# Patient Record
Sex: FEMALE | Race: WHITE | Employment: UNEMPLOYED | URBAN - METROPOLITAN AREA
[De-identification: names, ages, dates, MRNs, and addresses within clinical notes are randomized per-mention and may not be internally consistent; named-entity substitution may affect disease eponyms.]

---

## 2017-03-14 ENCOUNTER — HOSPITAL ENCOUNTER (EMERGENCY)
Age: 37
Discharge: HOME | End: 2017-03-14
Attending: FAMILY MEDICINE

## 2017-03-14 VITALS
BODY MASS INDEX: 21.53 KG/M2 | RESPIRATION RATE: 16 BRPM | HEIGHT: 66 IN | HEART RATE: 113 BPM | OXYGEN SATURATION: 100 % | TEMPERATURE: 98.7 F | WEIGHT: 134 LBS | DIASTOLIC BLOOD PRESSURE: 78 MMHG | SYSTOLIC BLOOD PRESSURE: 155 MMHG

## 2017-03-14 DIAGNOSIS — J06.9 ACUTE UPPER RESPIRATORY INFECTION: Primary | ICD-10-CM

## 2017-03-15 NOTE — UC PROVIDER NOTE
Patient is a 39 y.o. female presenting with nasal congestion. The history is provided by the patient. Nasal Congestion   This is a new problem. The current episode started more than 2 days ago. The problem occurs constantly. The problem has been gradually improving. Pertinent negatives include no chest pain and no headaches. Nothing aggravates the symptoms. Nothing relieves the symptoms. She has tried nothing for the symptoms. Past Medical History:   Diagnosis Date    Essential hypertension      1 st pregnancy few high blood pressure, 24 hour urine normal - few days before delivery    H/O maternal fourth degree perineal laceration, currently pregnant 10/7/2012    History of PCOS     Infertility, female     clomid pregnancy    Polycystic disease, ovaries     previously on metformin    Postpartum depression     saw a counselor        Past Surgical History:   Procedure Laterality Date    HX GYN      fourth degree laceration         Family History   Problem Relation Age of Onset    Hypertension Father     Heart Disease Father     Diabetes Father      Insluin dependendent (early onset)    Colon Cancer Maternal Grandmother [de-identified]    Cancer Maternal Grandmother      colon    Cancer Maternal Grandfather 58     Melanoma        Social History     Social History    Marital status:      Spouse name: Steven Zeng    Number of children: 0    Years of education: N/A     Occupational History     Farooq Rubio     Social History Main Topics    Smoking status: Never Smoker    Smokeless tobacco: Never Used    Alcohol use No    Drug use: No    Sexual activity: Yes     Partners: Male     Other Topics Concern    Not on file     Social History Narrative                ALLERGIES: Review of patient's allergies indicates no known allergies. Review of Systems   Constitutional: Negative for activity change and chills. HENT: Positive for congestion and sore throat. Respiratory: Negative for cough. Cardiovascular: Negative for chest pain. Musculoskeletal: Negative for arthralgias. Neurological: Negative for headaches. Vitals:    03/14/17 1930   BP: 155/78   Pulse: (!) 113   Resp: 16   Temp: 98.7 °F (37.1 °C)   SpO2: 100%   Weight: 60.8 kg (134 lb)   Height: 5' 6\" (1.676 m)       Physical Exam   Constitutional: She is oriented to person, place, and time. She appears well-developed and well-nourished. HENT:   Right Ear: External ear normal.   Left Ear: External ear normal.   Mouth/Throat: Oropharynx is clear and moist.   Eyes: EOM are normal.   Neck: Normal range of motion. Neck supple. No JVD present. No tracheal deviation present. No thyromegaly present. Cardiovascular: Normal rate, regular rhythm and normal heart sounds. Pulmonary/Chest: Effort normal and breath sounds normal.   Lymphadenopathy:     She has no cervical adenopathy. Neurological: She is alert and oriented to person, place, and time. Skin: Skin is warm and dry. Psychiatric: She has a normal mood and affect. Her behavior is normal. Judgment and thought content normal.   Nursing note and vitals reviewed. MDM     Differential Diagnosis; Clinical Impression; Plan:     CLINICAL IMPRESSION:  Acute upper respiratory infection  (primary encounter diagnosis)    Plan:  1. Rest, push fluids  2.   3.   Risk of Significant Complications, Morbidity, and/or Mortality:   Presenting problems: Moderate  Diagnostic procedures: Moderate  Management options:   Moderate  Progress:   Patient progress:  Stable      Procedures

## 2017-04-05 ENCOUNTER — OFFICE VISIT (OUTPATIENT)
Dept: FAMILY MEDICINE CLINIC | Age: 37
End: 2017-04-05

## 2017-04-05 VITALS
HEART RATE: 90 BPM | WEIGHT: 135.8 LBS | DIASTOLIC BLOOD PRESSURE: 81 MMHG | HEIGHT: 66 IN | SYSTOLIC BLOOD PRESSURE: 118 MMHG | OXYGEN SATURATION: 98 % | BODY MASS INDEX: 21.83 KG/M2 | RESPIRATION RATE: 18 BRPM | TEMPERATURE: 97.5 F

## 2017-04-05 DIAGNOSIS — G47.00 INSOMNIA, UNSPECIFIED TYPE: ICD-10-CM

## 2017-04-05 DIAGNOSIS — F41.9 ANXIETY: Primary | ICD-10-CM

## 2017-04-05 RX ORDER — TRAZODONE HYDROCHLORIDE 50 MG/1
50 TABLET ORAL
Qty: 30 TAB | Refills: 1 | Status: SHIPPED | OUTPATIENT
Start: 2017-04-05 | End: 2017-05-03

## 2017-04-05 RX ORDER — BUSPIRONE HYDROCHLORIDE 5 MG/1
5 TABLET ORAL 2 TIMES DAILY
Qty: 60 TAB | Refills: 1 | Status: SHIPPED | OUTPATIENT
Start: 2017-04-05 | End: 2017-07-27

## 2017-04-05 NOTE — PROGRESS NOTES
\"Reviewed record in preparation for visit and have obtained the necessary documentation\"  Chief Complaint   Patient presents with    New Patient    Anxiety    Insomnia       Patient presents in the office today as a new patient establishing care with a new PCP     Patient has past hx of PCOS     Patient would also like to discuss anxiety and insomnia related to a recent miscarriage in November         PHQ 2 / 9, over the last two weeks 4/5/2017   Little interest or pleasure in doing things Not at all   Feeling down, depressed or hopeless Not at all   Total Score PHQ 2 0

## 2017-04-05 NOTE — PROGRESS NOTES
HISTORY OF PRESENT ILLNESS  Clemencia Saxena is a 39 y.o. female. HPI: New patient comes in to get established and reports having anxiety/panic attacks with insomnia. Her symptoms started after she had miscarriage in Feb.2017. She had partial mole pregnancy and has to see her OB for monthly blood work. At times she has not been able to sleep all night due to anxiety, especially when her  is in business trip. She has two children 11and 3years old boys. She has tried tylenol PM but it didn't work. She is currently seeing a counselor and it is helping with anxiety. Past Medical History:   Diagnosis Date    Essential hypertension      1 st pregnancy few high blood pressure, 24 hour urine normal - few days before delivery    H/O maternal fourth degree perineal laceration, currently pregnant 10/7/2012    History of PCOS     Infertility, female     clomid pregnancy    Polycystic disease, ovaries     previously on metformin    Postpartum depression     saw a counselor     Past Surgical History:   Procedure Laterality Date    HX GYN      fourth degree laceration   No Known Allergies    Current Outpatient Prescriptions:     traZODone (DESYREL) 50 mg tablet, Take 1 Tab by mouth nightly., Disp: 30 Tab, Rfl: 1    busPIRone (BUSPAR) 5 mg tablet, Take 1 Tab by mouth two (2) times a day., Disp: 60 Tab, Rfl: 1  Review of Systems   Constitutional: Negative. Respiratory: Negative. Cardiovascular: Negative. Gastrointestinal: Negative. Psychiatric/Behavioral: Negative for depression, substance abuse and suicidal ideas. The patient is nervous/anxious and has insomnia. Blood pressure 118/81, pulse 90, temperature 97.5 °F (36.4 °C), temperature source Oral, resp. rate 18, height 5' 6\" (1.676 m), weight 135 lb 12.8 oz (61.6 kg), last menstrual period 03/31/2017, SpO2 98 %, not currently breastfeeding. Physical Exam   Constitutional: No distress.    HENT:   Mouth/Throat: Oropharynx is clear and moist.   Neck: Neck supple. Cardiovascular: Normal rate and regular rhythm. No murmur heard. Pulmonary/Chest: Effort normal and breath sounds normal.   Abdominal: Soft. Bowel sounds are normal.   Psychiatric: She has a normal mood and affect. Her behavior is normal.   Nursing note and vitals reviewed. ASSESSMENT and PLAN    ICD-10-CM ICD-9-CM    1. Anxiety F41.9 300.00 busPIRone (BUSPAR) 5 mg tablet   2.  Insomnia, unspecified type G47.00 780.52 traZODone (DESYREL) 50 mg tablet   Advised to follow up in one months  Pt was given an after visit summary which includes diagnosis, current medicines and vital and voiced understanding of treatment plan

## 2017-04-05 NOTE — MR AVS SNAPSHOT
Visit Information Date & Time Provider Department Dept. Phone Encounter #  
 4/5/2017 10:45 AM Martha Desai  [place] [phone] [id number] Upcoming Health Maintenance Date Due DTaP/Tdap/Td series (1 - Tdap) 10/12/2000 INFLUENZA AGE 9 TO ADULT 8/1/2016 PAP AKA CERVICAL CYTOLOGY 2/3/2017 Allergies as of 4/5/2017  Review Complete On: 4/5/2017 By: Martha Desai NP No Known Allergies Current Immunizations  Reviewed on 10/23/2015 Name Date Influenza Vaccine (Quad) PF 10/23/2015 Influenza Vaccine PF 9/19/2014, 9/24/2013 Influenza Vaccine Split 10/9/2012 12:18 PM  
  
 Not reviewed this visit You Were Diagnosed With   
  
 Codes Comments Insomnia, unspecified type    -  Primary ICD-10-CM: G47.00 ICD-9-CM: 780.52 Anxiety     ICD-10-CM: F41.9 ICD-9-CM: 300.00 Vitals BP Pulse Temp Resp Height(growth percentile) Weight(growth percentile) 118/81 (BP 1 Location: Left arm, BP Patient Position: Sitting) 90 97.5 °F (36.4 °C) (Oral) 18 5' 6\" (1.676 m) 135 lb 12.8 oz (61.6 kg) LMP SpO2 BMI OB Status Smoking Status 03/31/2017 98% 21.92 kg/m2 Having regular periods Never Smoker Vitals History BMI and BSA Data Body Mass Index Body Surface Area  
 21.92 kg/m 2 1.69 m 2 Preferred Pharmacy Pharmacy Name Phone [facility] [address] [place] [address] [phone] Your Updated Medication List  
  
   
This list is accurate as of: 4/5/17 11:07 AM.  Always use your most recent med list.  
  
  
  
  
 busPIRone 5 mg tablet Commonly known as:  BUSPAR Take 1 Tab by mouth two (2) times a day. traZODone 50 mg tablet Commonly known as:  Natalio Gold Take 1 Tab by mouth nightly. Prescriptions Sent to Pharmacy Refills  
 traZODone (DESYREL) 50 mg tablet 1 Sig: Take 1 Tab by mouth nightly. Class: Normal  
 Pharmacy: [facility] [address] [place] [address], [address] Ph #: [phone] Route: Oral  
 busPIRone (BUSPAR) 5 mg tablet 1 Sig: Take 1 Tab by mouth two (2) times a day. Class: Normal  
 Pharmacy: [facility] [address] [place] [address], 2000 [facility] [address] Ph #: [phone] Route: Oral  
  
Patient Instructions Insomnia: Care Instructions Your Care Instructions Insomnia is the inability to sleep well. It is a common problem for most people at some time. Insomnia may make it hard for you to get to sleep, stay asleep, or sleep as long as you need to. This can make you tired and grouchy during the day. It can also make you forgetful, less effective at work, and unhappy. Insomnia can be caused by conditions such as depression or anxiety. Pain can also affect your ability to sleep. When these problems are solved, the insomnia usually clears up. But sometimes bad sleep habits can cause insomnia. If insomnia is affecting your work or your enjoyment of life, you can take steps to improve your sleep. Follow-up care is a key part of your treatment and safety. Be sure to make and go to all appointments, and call your doctor if you are having problems. It's also a good idea to know your test results and keep a list of the medicines you take. How can you care for yourself at home? What to avoid · Do not have drinks with caffeine, such as coffee or black tea, for 8 hours before bed. · Do not smoke or use other types of tobacco near bedtime. Nicotine is a stimulant and can keep you awake. · Avoid drinking alcohol late in the evening, because it can cause you to wake in the middle of the night. · Do not eat a big meal close to bedtime. If you are hungry, eat a light snack.  
· Do not drink a lot of water close to bedtime, because the need to urinate may wake you up during the night. · Do not read or watch TV in bed. Use the bed only for sleeping and sexual activity. What to try · Go to bed at the same time every night, and wake up at the same time every morning. Do not take naps during the day. · Keep your bedroom quiet, dark, and cool. · Sleep on a comfortable pillow and mattress. · If watching the clock makes you anxious, turn it facing away from you so you cannot see the time. · If you worry when you lie down, start a worry book. Well before bedtime, write down your worries, and then set the book and your concerns aside. · Try meditation or other relaxation techniques before you go to bed. · If you cannot fall asleep, get up and go to another room until you feel sleepy. Do something relaxing. Repeat your bedtime routine before you go to bed again. · Make your house quiet and calm about an hour before bedtime. Turn down the lights, turn off the TV, log off the computer, and turn down the volume on music. This can help you relax after a busy day. When should you call for help? Watch closely for changes in your health, and be sure to contact your doctor if: 
· Your efforts to improve your sleep do not work. · Your insomnia gets worse. · You have been feeling down, depressed, or hopeless or have lost interest in things that you usually enjoy. Where can you learn more? Go to http://elsy-pacheco.info/. Enter P513 in the search box to learn more about \"Insomnia: Care Instructions. \" Current as of: July 26, 2016 Content Version: 11.2 © 5465-9269 [employer]. Care instructions adapted under license by [employer] (which disclaims liability or warranty for this information). If you have questions about a medical condition or this instruction, always ask your healthcare professional. [address] any warranty or liability for your use of this information. Introducing [facility]! Dear Sarah Trinidad: Thank you for requesting a [employer] account. Our records indicate that you have previously registered for a [employer] account but its currently inactive. Please call our [employer] support line at [phone]. Additional Information If you have questions, please visit the Frequently Asked Questions section of the [employer] website at [email or web address] Remember, [employer] is NOT to be used for urgent needs. For medical emergencies, dial 911. Now available from your iPhone and Android! Please provide this summary of care documentation to your next provider. Your primary care clinician is listed as Ellie León. If you have any questions after today's visit, please call [phone].

## 2017-05-03 ENCOUNTER — OFFICE VISIT (OUTPATIENT)
Dept: FAMILY MEDICINE CLINIC | Age: 37
End: 2017-05-03

## 2017-05-03 VITALS
BODY MASS INDEX: 22.02 KG/M2 | SYSTOLIC BLOOD PRESSURE: 108 MMHG | RESPIRATION RATE: 18 BRPM | OXYGEN SATURATION: 98 % | HEIGHT: 66 IN | DIASTOLIC BLOOD PRESSURE: 68 MMHG | TEMPERATURE: 98.5 F | HEART RATE: 100 BPM | WEIGHT: 137 LBS

## 2017-05-03 DIAGNOSIS — N91.2 AMENORRHEA: Primary | ICD-10-CM

## 2017-05-03 DIAGNOSIS — F41.9 ANXIETY: ICD-10-CM

## 2017-05-03 NOTE — PROGRESS NOTES
1. Have you been to the ER, urgent care clinic since your last visit? Hospitalized since your last visit? No    2. Have you seen or consulted any other health care providers outside of the [address] [place] [address] since your last visit? Include any pap smears or colon screening.  No\    Chief Complaint   Patient presents with    Follow-up     patient is here for a 1month follow up (medication)     Learning Assessment 9/25/2013   PRIMARY LEARNER Patient   PRIMARY LANGUAGE ENGLISH   LEARNER PREFERENCE PRIMARY LISTENING   ANSWERED BY Patient   RELATIONSHIP SELF

## 2017-05-03 NOTE — PROGRESS NOTES
HISTORY OF PRESENT ILLNESS  Clemencia Russo is a 39 y.o. female. HPI: Patient is following up on anxiety/insomnia. Reports stopped taking Trazodone after a week, but was taking Buspar until four days ago, it helped her with anxiety. She had unprotected sex over the Week End and believes she is pregnant and thus she stopped taking Buspar. She is late 4-5 days for her monthly menstrual cycle. She is worried that she might be pregnant, was advised by her OB not to get pregnant for six months after her miscarrige on mole pregnancy and finishing monthly blood wok for six months. Her home pregnancy test was negative, requesting serum HCG. Past Medical History:   Diagnosis Date    Essential hypertension      1 st pregnancy few high blood pressure, 24 hour urine normal - few days before delivery    H/O maternal fourth degree perineal laceration, currently pregnant 10/7/2012    History of PCOS     Infertility, female     clomid pregnancy    Polycystic disease, ovaries     previously on metformin    Postpartum depression     saw a counselor     Past Surgical History:   Procedure Laterality Date    HX GYN      fourth degree laceration   No Known Allergies    Current Outpatient Prescriptions:     busPIRone (BUSPAR) 5 mg tablet, Take 1 Tab by mouth two (2) times a day., Disp: 60 Tab, Rfl: 1  Review of Systems   Constitutional: Negative. Respiratory: Negative. Cardiovascular: Negative. Gastrointestinal: Negative. Genitourinary: Negative. Psychiatric/Behavioral: Negative for depression, hallucinations, memory loss, substance abuse and suicidal ideas. The patient is nervous/anxious. The patient does not have insomnia. Blood pressure 108/68, pulse 100, temperature 98.5 °F (36.9 °C), temperature source Oral, resp. rate 18, height 5' 6\" (1.676 m), weight 137 lb (62.1 kg), last menstrual period 03/31/2017, SpO2 98 %, not currently breastfeeding. Physical Exam   Constitutional: No distress. HENT:   Mouth/Throat: Oropharynx is clear and moist.   Neck: Neck supple. Cardiovascular: Normal rate and regular rhythm. No murmur heard. Pulmonary/Chest: Effort normal and breath sounds normal.   Abdominal: Soft. Bowel sounds are normal.   Psychiatric: She has a normal mood and affect. Her behavior is normal.   Nursing note and vitals reviewed. ASSESSMENT and PLAN    ICD-10-CM ICD-9-CM    1. Amenorrhea N91.2 626.0 HCG QL SERUM   2.  Anxiety F41.9 300.00    await labs  Hold Buspar, if pregnant, will change it to Zoloft  Pt was given an after visit summary which includes diagnosis, current medicines and vital and voiced understanding of treatment plan

## 2017-05-04 LAB — B-HCG SERPL QL: NEGATIVE MIU/ML

## 2017-05-05 ENCOUNTER — TELEPHONE (OUTPATIENT)
Dept: FAMILY MEDICINE CLINIC | Age: 37
End: 2017-05-05

## 2017-07-26 ENCOUNTER — OFFICE VISIT (OUTPATIENT)
Dept: OBGYN CLINIC | Age: 37
End: 2017-07-26

## 2017-07-26 VITALS — BODY MASS INDEX: 23.24 KG/M2 | WEIGHT: 144 LBS | DIASTOLIC BLOOD PRESSURE: 66 MMHG | SYSTOLIC BLOOD PRESSURE: 100 MMHG

## 2017-07-26 DIAGNOSIS — Z34.81 PRENATAL CARE, SUBSEQUENT PREGNANCY, FIRST TRIMESTER: Primary | ICD-10-CM

## 2017-07-26 DIAGNOSIS — Z34.81 PRENATAL CARE, SUBSEQUENT PREGNANCY IN FIRST TRIMESTER: ICD-10-CM

## 2017-07-26 NOTE — PROGRESS NOTES
Current pregnancy history:    Meena Sweeney is a ,  39 y.o. female [facility] Patient's last menstrual period was 2017. .  She presents for the evaluation of amenorrhea and a positive pregnancy test.    LMP history:  The date of her LMP is very certain. Her last menstrual period was normal and lasted for 4 to 5 days. Based on her LMP, her EDC is 3/14 and her EGA is 7 weeks,0 days. Ultrasound data:  A SINGLE VIABLE 6W5D WITH HENNA OF 2018 IUP IS SEEN WITH NORMAL CARDIAC RHYTHM. GESTATIONAL AGE BASED ON ULTRASOUND. A NORMAL YOLK SAC IS SEEN. RIGHT OVARY APPEAR WITHIN NORMAL LIMITS. LEFT OVARY APPEARS WITHING NORMAL LIMITS. NO FREE FLUID IS SEEN IN THE CDS. Pregnancy symptoms:    Since her LMP she has experienced  urinary frequency, breast tenderness, and nausea. She has been vomiting over the last few weeks. Associated signs and symptoms which she denies: dysuria, discharge, vaginal bleeding. She states she has gained weight:  Approximately 5 pounds over the last few weeks. Relevant past pregnancy history:   She has the following pregnancy history: Her last pregnancy was uncomplicated. She has no history of  delivery. Relevant past medical history:(relevant to this pregnancy): noncontributory. 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 Substance history: negative for alcohol, tobacco and street drugs. Positive for nothing. Exposure history: There is/are no indoor cat/s in the home. The patient was instructed to not change the cat litter. She admits close contact with children on a regular basis. She has had chicken pox or the vaccine in the past.   Patient denies issues with domestic violence. Genetic Screening/Teratology Counseling: (Includes patient, baby's father, or anyone in either family with:)  3.  Patient's age >/= 28 at [facility]?-- YES  2.   Thalassemia ([facility], [facility], [address], or  background): MCV<80?--no.     3.  Neural tube defect (meningomyelocele, spina bifida, anencephaly)?--no.   4.  Congenital heart defect?--no.  5.  Down syndrome?--no.   6.  Robb-Sachs ([place], [place])?--no.   7.  Canavan's Disease?--no.   8.  Familial Dysautonomia?--no.   9.  Sickle cell disease or trait ()? --no   The patient has not been tested for sickle trait  10. Hemophilia or other blood disorders?--no. 11.  Muscular dystrophy?--no. 12.  Cystic fibrosis?--no. 13.  [place]'s Chorea?--no. 14.  Mental retardation/autism (if yes was person tested for Fragile X)?--no. 15.  Other inherited genetic or chromosomal disorder?--no. 12.  Maternal metabolic disorder (DM, PKU, etc)?--no. 17.  Patient or FOB with a child with a birth defect not listed above?--no.  17a. Patient or FOB with a birth defect themselves?--no. 18.  Recurrent pregnancy loss, or stillbirth?--no. 19.  Any medications since LMP other than prenatal vitamins (include vitamins, supplements, OTC meds, drugs, alcohol)?--no. 20.  Any other genetic/environmental exposure to discuss?--no. Infection History:  1. Lives with someone with TB or TB exposed?--no.   2.  Patient or partner has history of genital herpes?--no.  3.  Rash or viral illness since LMP?--no.    4.  History of STD (GC, CT, HPV, syphilis, HIV)? --no   5. Other: OTHER?       Past Medical History:   Diagnosis Date    Essential hypertension      1 st pregnancy few high blood pressure, 24 hour urine normal - few days before delivery    H/O maternal fourth degree perineal laceration, currently pregnant 10/7/2012    History of PCOS     Infertility, female     clomid pregnancy    Polycystic disease, ovaries     previously on metformin    Postpartum depression     saw a counselor     Past Surgical History:   Procedure Laterality Date    HX GYN      fourth degree laceration     Social History     Occupational History    [employer]     Social History Main Topics    Smoking status: Never Smoker    Smokeless tobacco: Never Used    Alcohol use 0.0 oz/week      Comment: socially     Drug use: No    Sexual activity: Yes     Partners: Male     Birth control/ protection: None     Family History   Problem Relation Age of Onset    Hypertension Father     Heart Disease Father     Diabetes Father      Insluin dependendent (early onset)    Colon Cancer Maternal Grandmother [de-identified]    Cancer Maternal Grandmother      colon    Cancer Maternal Grandfather 58     Melanoma     OB History    Para Term  AB Living   5 2 2   2   SAB TAB Ectopic Molar Multiple Live Births        2      # Outcome Date GA Lbr Jonah/2nd Weight Sex Delivery Anes PTL Lv   5 Current            4 Term 01/25/15 38w4d 01:44 / :22 8 lb 4.5 oz (3.755 kg) M VAGINAL DELI EPIDURAL AN N PRATIK   3 Term 10/07/12 40w6d  8 lb 1.8 oz (3.68 kg) M VAGINAL DELI Local N PRATIK      Birth Comments: Fourth degree repair   2             1                  No Known Allergies  Prior to Admission medications    Medication Sig Start Date End Date Taking? Authorizing Provider   PNV No12-Iron-FA-DSS-OM-3 29 mg iron-1 mg -50 mg CPKD Take  by mouth. Yes Historical Provider   busPIRone (BUSPAR) 5 mg tablet Take 1 Tab by mouth two (2) times a day.  17   Calvin Fletcher NP        Review of Systems: History obtained from the patient  Constitutional: negative for weight loss, fever, night sweats  HEENT: negative for hearing loss, earache, congestion, snoring, sore throat  CV: negative for chest pain, palpitations, edema  Resp: negative for cough, shortness of breath, wheezing  Breast: negative for breast lumps, nipple discharge, galactorrhea  GI: negative for change in bowel habits, abdominal pain, black or bloody stools  : negative for frequency, dysuria, hematuria, vaginal discharge  MSK: negative for back pain, joint pain, muscle pain  Skin: negative for itching, rash, hives  Neuro: negative for dizziness, headache, confusion, weakness  Psych: negative for anxiety, depression, change in mood  Heme/lymph: negative for bleeding, bruising, pallor    Objective:  Visit Vitals    /66    Wt 144 lb (65.3 kg)    LMP 03/31/2017    BMI 23.24 kg/m2       Physical Exam:     Constitutional  · Appearance: well-nourished, well developed, alert, in no acute distress    HENT  · Head  · Face: appears normal  · Eyes: appear normal  · Ears: normal  · Mouth: normal  · Lips: no lesions    ·      Chest  · Respiratory Effort: breathing unlabored  · Auscultation: normal breath sounds    Cardiovascular  · Heart:  · Auscultation: regular rate and rhythm without murmur    Gastrointestinal  · Abdominal Examination: abdomen non-tender to palpation, normal bowel sounds, no masses present  · Liver and spleen: no hepatomegaly present, spleen not palpable  · Hernias: no hernias identified    Genitourinary  · deferred    Skin  · General Inspection: no rash, no lesions identified    Neurologic/Psychiatric  · Mental Status:  · Orientation: grossly oriented to person, place and time  · Mood and Affect: mood normal, affect appropriate    Assessment:   Intrauterine pregnancy with the following problems identified: AMA- panorama and FS; hx partial molar pregnancy, hx SVDx2 boys; 1st w 4th degree lac; safe 2nd, CF declined    Plan:   Prenatal labs next visit w [facility] samples  Offered CF testing, CVS, Nuchal Translucency, MSAFP, amnio, and discussed NIPT; panorama  Course of pregnancy discussed including visit schedule, routine U/S, glucola testing, etc.  Avoid alcoholic beverages and illicit/recreational drugs use  Take prenatal vitamins or folic acid daily. Hospital and practice style discussed with coverage system.   Discussed nutrition, toxoplasmosis precautions, sexual activity, exercise, need for influenza vaccine, environmental and work hazards, travel advice, screen for domestic violence, need for seat belts. Discussed seafood, unpasteurized dairy products, deli meat, artificial sweeteners, and caffeine. Discussed current prescription drug use. Given medication list.  Discussed the use of over the counter medications and chemicals. Route of delivery discussed, including risks, benefits     Handouts given to pt.

## 2017-07-31 ENCOUNTER — TELEPHONE (OUTPATIENT)
Dept: OBGYN CLINIC | Age: 37
End: 2017-07-31

## 2017-07-31 RX ORDER — DOXYLAMINE SUCCINATE AND PYRIDOXINE HYDROCHLORIDE, DELAYED RELEASE TABLETS 10 MG/10 MG 10; 10 MG/1; MG/1
1 TABLET, DELAYED RELEASE ORAL
Qty: 120 TAB | Refills: 2 | Status: SHIPPED | OUTPATIENT
Start: 2017-07-31 | End: 2017-09-07

## 2017-07-31 RX ORDER — DOXYLAMINE SUCCINATE AND PYRIDOXINE HYDROCHLORIDE, DELAYED RELEASE TABLETS 10 MG/10 MG 10; 10 MG/1; MG/1
1 TABLET, DELAYED RELEASE ORAL
Qty: 120 TAB | Refills: 2 | Status: SHIPPED | OUTPATIENT
Start: 2017-07-31 | End: 2017-08-18

## 2017-07-31 NOTE — TELEPHONE ENCOUNTER
Please delete one prescription so prior can be obtained.  Please call pharmacy to discontinue the deleted prescription

## 2017-07-31 NOTE — TELEPHONE ENCOUNTER
Message left at 11:19am       39year old  7w5d pregnant patient last seen in the office 17. Patient left a message for a refill on her Diclegis for nausea. This nurse called that patient and transferred the patient to Dr. Kristopher Hill nurse. Patient verbalized understanding. Patient called back at 1:!3PM. This nurse transferred the patient a second time and stated I would send a message. Patient verbalized understanding.

## 2017-08-02 NOTE — TELEPHONE ENCOUNTER
Called [employer] for prior auth and given new id # , advised pharmacy of new [employer] and they state they will call insurance company

## 2017-08-17 LAB
HBSAG, EXTERNAL: NEGATIVE
HIV, EXTERNAL: NON REACTIVE
RUBELLA, EXTERNAL: NORMAL
T. PALLIDUM, EXTERNAL: NEGATIVE

## 2017-08-18 ENCOUNTER — ROUTINE PRENATAL (OUTPATIENT)
Dept: OBGYN CLINIC | Age: 37
End: 2017-08-18

## 2017-08-18 VITALS — SYSTOLIC BLOOD PRESSURE: 108 MMHG | WEIGHT: 147 LBS | DIASTOLIC BLOOD PRESSURE: 64 MMHG | BODY MASS INDEX: 23.73 KG/M2

## 2017-08-18 DIAGNOSIS — Z34.81 PRENATAL CARE, SUBSEQUENT PREGNANCY IN FIRST TRIMESTER: ICD-10-CM

## 2017-08-18 DIAGNOSIS — Z34.81 PRENATAL CARE, SUBSEQUENT PREGNANCY, FIRST TRIMESTER: Primary | ICD-10-CM

## 2017-08-18 NOTE — PATIENT INSTRUCTIONS

## 2017-08-18 NOTE — PROGRESS NOTES
TA ULTRASOUND PERFORMED  A SINGLE VIABLE 10W2D WITH IUP IS SEEN WITH NORMAL CARDIAC RHYTHM. GESTATIONAL AGE BASED ON ULTRASOUND. A NORMAL YOLK SAC IS SEEN. RIGHT OVARY APPEAR WITHIN NORMAL LIMITS. LEFT OVARY APPEARS WITHING NORMAL LIMITS.      Happy and relieved; now wants to decline any genetic testing but FS

## 2017-08-22 LAB
ABO GROUP BLD: NORMAL
BLD GP AB SCN SERPL QL: NEGATIVE
ERYTHROCYTE [DISTWIDTH] IN BLOOD BY AUTOMATED COUNT: 17.1 % (ref 12.3–15.4)
HBV SURFACE AG SERPL QL IA: NEGATIVE
HCT VFR BLD AUTO: 39 % (ref 34–46.6)
HGB BLD-MCNC: 12.8 G/DL (ref 11.1–15.9)
HIV 1+2 AB+HIV1 P24 AG SERPL QL IA: NON REACTIVE
MCH RBC QN AUTO: 26.3 PG (ref 26.6–33)
MCHC RBC AUTO-ENTMCNC: 32.8 G/DL (ref 31.5–35.7)
MCV RBC AUTO: 80 FL (ref 79–97)
PLATELET # BLD AUTO: 234 X10E3/UL (ref 150–379)
RBC # BLD AUTO: 4.86 X10E6/UL (ref 3.77–5.28)
RH BLD: POSITIVE
RUBV IGG SERPL IA-ACNC: 2.5 INDEX
T PALLIDUM AB SER QL IA: NEGATIVE
WBC # BLD AUTO: 7.8 X10E3/UL (ref 3.4–10.8)

## 2017-08-23 LAB
C TRACH RRNA CVX QL NAA+PROBE: NEGATIVE
CYTOLOGIST CVX/VAG CYTO: NORMAL
CYTOLOGY CVX/VAG DOC THIN PREP: NORMAL
DX ICD CODE: NORMAL
LABCORP, 190119: NORMAL
Lab: NORMAL
N GONORRHOEA RRNA CVX QL NAA+PROBE: NEGATIVE
OTHER STN SPEC: NORMAL
PATH REPORT.FINAL DX SPEC: NORMAL
STAT OF ADQ CVX/VAG CYTO-IMP: NORMAL
T VAGINALIS RRNA SPEC QL NAA+PROBE: NEGATIVE

## 2017-09-07 ENCOUNTER — ROUTINE PRENATAL (OUTPATIENT)
Dept: OBGYN CLINIC | Age: 37
End: 2017-09-07

## 2017-09-07 VITALS
WEIGHT: 150.25 LBS | DIASTOLIC BLOOD PRESSURE: 76 MMHG | SYSTOLIC BLOOD PRESSURE: 112 MMHG | BODY MASS INDEX: 24.25 KG/M2

## 2017-09-07 DIAGNOSIS — Z34.82 PRENATAL CARE, SUBSEQUENT PREGNANCY, SECOND TRIMESTER: Primary | ICD-10-CM

## 2017-09-07 DIAGNOSIS — Z34.81 PRENATAL CARE, SUBSEQUENT PREGNANCY IN FIRST TRIMESTER: ICD-10-CM

## 2017-09-07 NOTE — PATIENT INSTRUCTIONS

## 2017-09-28 ENCOUNTER — ROUTINE PRENATAL (OUTPATIENT)
Dept: OBGYN CLINIC | Age: 37
End: 2017-09-28

## 2017-09-28 VITALS
BODY MASS INDEX: 24.81 KG/M2 | SYSTOLIC BLOOD PRESSURE: 140 MMHG | HEIGHT: 66 IN | DIASTOLIC BLOOD PRESSURE: 82 MMHG | WEIGHT: 154.4 LBS

## 2017-09-28 DIAGNOSIS — Z34.82 PRENATAL CARE, SUBSEQUENT PREGNANCY, SECOND TRIMESTER: Primary | ICD-10-CM

## 2017-09-28 NOTE — PROGRESS NOTES
Anxiety with this visit today due to history of 16 week loss.  Feels much better after hearing [facility]- ana cristina scan with next visit- feeling hopefully and excited for next visit

## 2017-09-28 NOTE — PATIENT INSTRUCTIONS

## 2017-10-27 ENCOUNTER — ROUTINE PRENATAL (OUTPATIENT)
Dept: OBGYN CLINIC | Age: 37
End: 2017-10-27

## 2017-10-27 ENCOUNTER — HOSPITAL ENCOUNTER (OUTPATIENT)
Dept: PERINATAL CARE | Age: 37
Discharge: HOME | End: 2017-10-27
Attending: OBSTETRICS & GYNECOLOGY
Payer: COMMERCIAL

## 2017-10-27 VITALS — BODY MASS INDEX: 25.87 KG/M2 | WEIGHT: 160.25 LBS

## 2017-10-27 DIAGNOSIS — Z34.82 PRENATAL CARE, SUBSEQUENT PREGNANCY IN SECOND TRIMESTER: Primary | ICD-10-CM

## 2017-10-27 DIAGNOSIS — Z34.81 PRENATAL CARE, SUBSEQUENT PREGNANCY IN FIRST TRIMESTER: ICD-10-CM

## 2017-10-27 PROCEDURE — 76811 OB US DETAILED SNGL FETUS: CPT | Performed by: OBSTETRICS & GYNECOLOGY

## 2017-10-27 NOTE — PATIENT INSTRUCTIONS

## 2017-11-21 ENCOUNTER — ROUTINE PRENATAL (OUTPATIENT)
Dept: OBGYN CLINIC | Age: 37
End: 2017-11-21

## 2017-11-21 VITALS — WEIGHT: 166 LBS | BODY MASS INDEX: 26.79 KG/M2 | DIASTOLIC BLOOD PRESSURE: 76 MMHG | SYSTOLIC BLOOD PRESSURE: 120 MMHG

## 2017-11-21 DIAGNOSIS — Z34.81 PRENATAL CARE, SUBSEQUENT PREGNANCY IN FIRST TRIMESTER: ICD-10-CM

## 2017-11-21 NOTE — PATIENT INSTRUCTIONS
Weeks 22 to 26 of Your Pregnancy: Care Instructions  Your Care Instructions    As you enter your 7th month of pregnancy at week 26, your baby's lungs are growing stronger and getting ready to breathe. You may notice that your baby responds to the sound of your or your partner's voice. You may also notice that your baby does less turning and twisting and more squirming or jerking. Jerking often means that your baby has the hiccups. Hiccups are perfectly normal and are only temporary. You may want to think about attending a childbirth preparation class. This is also a good time to start thinking about whether you want to have pain medicine during labor. Most pregnant women are tested for gestational diabetes between weeks 25 and 28. Gestational diabetes occurs when your blood sugar level gets too high when you're pregnant. The test is important, because you can have gestational diabetes and not know it. But the condition can cause problems for your baby. Follow-up care is a key part of your treatment and safety. Be sure to make and go to all appointments, and call your doctor if you are having problems. It's also a good idea to know your test results and keep a list of the medicines you take. How can you care for yourself at home? Ease discomfort from your baby's kicking  · Change your position. Sometimes this will cause your baby to change position too. · Take a deep breath while you raise your arm over your head. Then breathe out while you drop your arm. Do Kegel exercises to prevent urine from leaking  · You can do Kegel exercises while you stand or sit. ¨ Squeeze the same muscles you would use to stop your urine. Your belly and thighs should not move. ¨ Hold the squeeze for 3 seconds, and then relax for 3 seconds. ¨ Start with 3 seconds. Then add 1 second each week until you are able to squeeze for 10 seconds. ¨ Repeat the exercise 10 to 15 times for each session.  Do three or more sessions each day.  Ease or reduce swelling in your feet, ankles, hands, and fingers  · If your fingers are puffy, take off your rings. · Do not eat high-salt foods, such as potato chips. · Prop up your feet on a stool or couch as much as possible. Sleep with pillows under your feet. · Do not stand for long periods of time or wear tight shoes. · Wear support stockings. Where can you learn more? Go to http://elsy-pacheco.info/. Enter G264 in the search box to learn more about \"Weeks 22 to 26 of Your Pregnancy: Care Instructions. \"  Current as of: March 16, 2017  Content Version: 11.4  © 1355-3822 [employer]. Care instructions adapted under license by [employer] (which disclaims liability or warranty for this information). If you have questions about a medical condition or this instruction, always ask your healthcare professional. [place] [address] any warranty or liability for your use of this information.

## 2017-12-20 ENCOUNTER — ROUTINE PRENATAL (OUTPATIENT)
Dept: OBGYN CLINIC | Age: 37
End: 2017-12-20

## 2017-12-20 VITALS — DIASTOLIC BLOOD PRESSURE: 82 MMHG | WEIGHT: 167 LBS | SYSTOLIC BLOOD PRESSURE: 124 MMHG | BODY MASS INDEX: 26.95 KG/M2

## 2017-12-20 DIAGNOSIS — Z23 ENCOUNTER FOR IMMUNIZATION: ICD-10-CM

## 2017-12-20 DIAGNOSIS — Z34.81 PRENATAL CARE, SUBSEQUENT PREGNANCY IN FIRST TRIMESTER: ICD-10-CM

## 2017-12-20 DIAGNOSIS — Z34.83 PRENATAL CARE, SUBSEQUENT PREGNANCY IN THIRD TRIMESTER: Primary | ICD-10-CM

## 2017-12-20 NOTE — PATIENT INSTRUCTIONS
Weeks 26 to 30 of Your Pregnancy: Care Instructions  Your Care Instructions    You are now in your last trimester of pregnancy. Your baby is growing rapidly. And you'll probably feel your baby moving around more often. Your doctor may ask you to count your baby's kicks. Your back may ache as your body gets used to your baby's size and length. If you haven't already had the Tdap shot during this pregnancy, talk to your doctor about getting it. It will help protect your  against pertussis infection. During this time, it's important to take care of yourself and pay attention to what your body needs. If you feel sexual, explore ways to be close with your partner that match your comfort and desire. Use the tips provided in this care sheet to find ways to be sexual in your own way. Follow-up care is a key part of your treatment and safety. Be sure to make and go to all appointments, and call your doctor if you are having problems. It's also a good idea to know your test results and keep a list of the medicines you take. How can you care for yourself at home? Take it easy at work  · Take frequent breaks. If possible, stop working when you are tired, and rest during your lunch hour. · Take bathroom breaks every 2 hours. · Change positions often. If you sit for long periods, stand up and walk around. · When you stand for a long time, keep one foot on a low stool with your knee bent. After standing a lot, sit with your feet up. · Avoid fumes, chemicals, and tobacco smoke. Be sexual in your own way  · Having sex during pregnancy is okay, unless your doctor tells you not to. · You may be very interested in sex, or you may have no interest at all. · Your growing belly can make it hard to find a good position during intercourse. [place] and explore. · You may get cramps in your uterus when your partner touches your breasts.   · A back rub may relieve the backache or cramps that sometimes follow orgasm. Learn about  labor  · Watch for signs of  labor. You may be going into labor if:  ¨ You have menstrual-like cramps, with or without nausea. ¨ You have about 4 or more contractions in 20 minutes, or about 8 or more within 1 hour, even after you have had a glass of water and are resting. ¨ You have a low, dull backache that does not go away when you change your position. ¨ You have pain or pressure in your pelvis that comes and goes in a pattern. ¨ You have intestinal cramping or flu-like symptoms, with or without diarrhea. ¨ You notice an increase or change in your vaginal discharge. Discharge may be heavy, mucus-like, watery, or streaked with blood. ¨ Your water breaks. · If you think you have  labor:  ¨ Drink 2 or 3 glasses of water or juice. Not drinking enough fluids can cause contractions. ¨ Stop what you are doing, and empty your bladder. Then lie down on your left side for at least 1 hour. ¨ While lying on your side, find your breast bone. Put your fingers in the soft spot just below it. Move your fingers down toward your belly button to find the top of your uterus. Check to see if it is tight. ¨ Contractions can be weak or strong. Record your contractions for an hour. Time a contraction from the start of one contraction to the start of the next one. ¨ Single or several strong contractions without a pattern are called Bharath-Altman contractions. They are practice contractions but not the start of labor. They often stop if you change what you are doing. ¨ Call your doctor if you have regular contractions. Where can you learn more? Go to http://elsy-pacheco.info/. Enter X387 in the search box to learn more about \"Weeks 26 to 30 of Your Pregnancy: Care Instructions. \"  Current as of: 2017  Content Version: 11.4  © 8884-9697 [employer].  Care instructions adapted under license by [employer] (which disclaims liability or warranty for this information). If you have questions about a medical condition or this instruction, always ask your healthcare professional. [place] [address] any warranty or liability for your use of this information.

## 2017-12-21 LAB
BASOPHILS # BLD AUTO: 0 X10E3/UL (ref 0–0.2)
BASOPHILS NFR BLD AUTO: 0 %
EOSINOPHIL # BLD AUTO: 0 X10E3/UL (ref 0–0.4)
EOSINOPHIL NFR BLD AUTO: 1 %
ERYTHROCYTE [DISTWIDTH] IN BLOOD BY AUTOMATED COUNT: 15.4 % (ref 12.3–15.4)
GLUCOSE 1H P 50 G GLC PO SERPL-MCNC: 111 MG/DL (ref 65–139)
HCT VFR BLD AUTO: 33.1 % (ref 34–46.6)
HGB BLD-MCNC: 11.3 G/DL (ref 11.1–15.9)
IMM GRANULOCYTES # BLD: 0 X10E3/UL (ref 0–0.1)
IMM GRANULOCYTES NFR BLD: 0 %
LYMPHOCYTES # BLD AUTO: 1.3 X10E3/UL (ref 0.7–3.1)
LYMPHOCYTES NFR BLD AUTO: 16 %
MCH RBC QN AUTO: 27.8 PG (ref 26.6–33)
MCHC RBC AUTO-ENTMCNC: 34.1 G/DL (ref 31.5–35.7)
MCV RBC AUTO: 82 FL (ref 79–97)
MONOCYTES # BLD AUTO: 0.4 X10E3/UL (ref 0.1–0.9)
MONOCYTES NFR BLD AUTO: 4 %
NEUTROPHILS # BLD AUTO: 6.3 X10E3/UL (ref 1.4–7)
NEUTROPHILS NFR BLD AUTO: 79 %
PLATELET # BLD AUTO: 220 X10E3/UL (ref 150–379)
RBC # BLD AUTO: 4.06 X10E6/UL (ref 3.77–5.28)
WBC # BLD AUTO: 7.9 X10E3/UL (ref 3.4–10.8)

## 2017-12-22 NOTE — PROGRESS NOTES
Good news- your sugar testing is normal but you are slightly anemic. I encourage you to increase iron rich foods in your diet or to start an over the counter iron supplement.   We can review this at your next appointment

## 2018-01-03 ENCOUNTER — ROUTINE PRENATAL (OUTPATIENT)
Dept: OBGYN CLINIC | Age: 38
End: 2018-01-03

## 2018-01-03 VITALS
BODY MASS INDEX: 27.82 KG/M2 | SYSTOLIC BLOOD PRESSURE: 130 MMHG | DIASTOLIC BLOOD PRESSURE: 84 MMHG | WEIGHT: 172.38 LBS

## 2018-01-03 DIAGNOSIS — Z34.83 PRENATAL CARE, SUBSEQUENT PREGNANCY IN THIRD TRIMESTER: Primary | ICD-10-CM

## 2018-01-16 ENCOUNTER — ROUTINE PRENATAL (OUTPATIENT)
Dept: OBGYN CLINIC | Age: 38
End: 2018-01-16

## 2018-01-16 VITALS
BODY MASS INDEX: 28.12 KG/M2 | SYSTOLIC BLOOD PRESSURE: 124 MMHG | DIASTOLIC BLOOD PRESSURE: 82 MMHG | WEIGHT: 174.25 LBS

## 2018-01-16 DIAGNOSIS — Z34.83 PRENATAL CARE, SUBSEQUENT PREGNANCY IN THIRD TRIMESTER: Primary | ICD-10-CM

## 2018-01-16 NOTE — PATIENT INSTRUCTIONS

## 2018-01-30 ENCOUNTER — ROUTINE PRENATAL (OUTPATIENT)
Dept: OBGYN CLINIC | Age: 38
End: 2018-01-30

## 2018-01-30 VITALS — BODY MASS INDEX: 28.73 KG/M2 | SYSTOLIC BLOOD PRESSURE: 128 MMHG | DIASTOLIC BLOOD PRESSURE: 82 MMHG | WEIGHT: 178 LBS

## 2018-01-30 DIAGNOSIS — Z34.83 PRENATAL CARE, SUBSEQUENT PREGNANCY IN THIRD TRIMESTER: Primary | ICD-10-CM

## 2018-01-30 NOTE — PATIENT INSTRUCTIONS

## 2018-02-13 ENCOUNTER — ROUTINE PRENATAL (OUTPATIENT)
Dept: OBGYN CLINIC | Age: 38
End: 2018-02-13

## 2018-02-13 VITALS — WEIGHT: 181 LBS | BODY MASS INDEX: 29.21 KG/M2 | SYSTOLIC BLOOD PRESSURE: 122 MMHG | DIASTOLIC BLOOD PRESSURE: 80 MMHG

## 2018-02-13 DIAGNOSIS — Z34.83 PRENATAL CARE, SUBSEQUENT PREGNANCY IN THIRD TRIMESTER: Primary | ICD-10-CM

## 2018-02-13 LAB — GRBS, EXTERNAL: NEGATIVE

## 2018-02-17 LAB — B-HEM STREP SPEC QL CULT: NEGATIVE

## 2018-02-21 ENCOUNTER — ROUTINE PRENATAL (OUTPATIENT)
Dept: OBGYN CLINIC | Age: 38
End: 2018-02-21

## 2018-02-21 VITALS — DIASTOLIC BLOOD PRESSURE: 82 MMHG | WEIGHT: 181.5 LBS | SYSTOLIC BLOOD PRESSURE: 120 MMHG | BODY MASS INDEX: 29.29 KG/M2

## 2018-02-21 DIAGNOSIS — Z34.83 PRENATAL CARE, SUBSEQUENT PREGNANCY IN THIRD TRIMESTER: Primary | ICD-10-CM

## 2018-02-21 DIAGNOSIS — Z34.81 PRENATAL CARE, SUBSEQUENT PREGNANCY IN FIRST TRIMESTER: ICD-10-CM

## 2018-02-21 NOTE — PATIENT INSTRUCTIONS
Week 37 of Your Pregnancy: Care Instructions  Your Care Instructions    You are near the end of your pregnancy-and you're probably pretty uncomfortable. It may be harder to walk around. Lying down probably isn't comfortable either. You may have trouble getting to sleep or staying asleep. Most women deliver their babies between 40 and 41 weeks. This is a good time to think about packing a bag for the hospital with items you'll need. Then you'll be ready when labor starts. Follow-up care is a key part of your treatment and safety. Be sure to make and go to all appointments, and call your doctor if you are having problems. It's also a good idea to know your test results and keep a list of the medicines you take. How can you care for yourself at home? Learn about breastfeeding  · Breastfeeding is best for your baby and good for you. · Breast milk has antibodies to help your baby fight infections. · Mothers who breastfeed often lose weight faster, because making milk burns calories. · Learning the best ways to hold your baby will make breastfeeding easier. · Let your partner bathe and diaper the baby to keep your partner from feeling left out. Snuggle together when you breastfeed. · You may want to learn how to use a breast pump and store your milk. · If you choose to bottle feed, make the feeding feel like breastfeeding so you can bond with your baby. Always hold your baby and the bottle. Do not prop bottles or let your baby fall asleep with a bottle. Learn about crying  · It is common for babies to cry for 1 to 3 hours a day. Some cry more, some cry less. · Babies don't cry to make you upset or because you are a bad parent. · Crying is how your baby communicates. Your baby may be hungry; have gas; need a diaper change; or feel cold, warm, tired, lonely, or tense. Sometimes babies cry for unknown reasons. · If you respond to your baby's needs, he or she will learn to trust you.   · Try to stay calm when your baby cries. Your baby may get more upset if he or she senses that you are upset. Know how to care for your   · Your baby's umbilical cord stump will drop off on its own, usually between 1 and 2 weeks. To care for your baby's umbilical cord area:  ¨ Clean the area at the bottom of the cord 2 or 3 times a day. ¨ Pay special attention to the area where the cord attaches to the skin. ¨ Keep the diaper folded below the cord. ¨ Use a damp washcloth or cotton ball to sponge bathe your baby until the stump has come off. · Your baby's first dark stool is called meconium. After the meconium is passed, your baby will develop his or her own bowel pattern. ¨ Some babies, especially  babies, have several bowel movements a day. Others have one or two a day, or one every 2 to 3 days. ¨  babies often have loose, yellow stools. Formula-fed babies have more formed stools. ¨ If your baby's stools look like little pellets, he or she is constipated. After 2 days of constipation, call your baby's doctor. · If your baby will be circumcised, you can care for him at home. ¨ Gently rinse his penis with warm water after every diaper change. Do not try to remove the film that forms on the penis. This film will go away on its own. Pat dry. ¨ Put petroleum ointment, such as Vaseline, on the area of the diaper that will touch your baby's penis. This will keep the diaper from sticking to your baby. ¨ Ask the doctor about giving your baby acetaminophen (Tylenol) for pain. Where can you learn more? Go to http://elsy-pacheco.info/. Enter 68 21 [id number] in the search box to learn more about \"Week 37 of Your Pregnancy: Care Instructions. \"  Current as of: 2017  Content Version: 11.4  © 5449-9264 [employer]. Care instructions adapted under license by [employer] (which disclaims liability or warranty for this information).  If you have questions about a medical condition or this instruction, always ask your healthcare professional. [place] [address] any warranty or liability for your use of this information.

## 2018-02-27 ENCOUNTER — ROUTINE PRENATAL (OUTPATIENT)
Dept: OBGYN CLINIC | Age: 38
End: 2018-02-27

## 2018-02-27 VITALS — SYSTOLIC BLOOD PRESSURE: 132 MMHG | DIASTOLIC BLOOD PRESSURE: 82 MMHG | BODY MASS INDEX: 29.38 KG/M2 | WEIGHT: 182 LBS

## 2018-02-27 DIAGNOSIS — Z34.83 PRENATAL CARE, SUBSEQUENT PREGNANCY IN THIRD TRIMESTER: Primary | ICD-10-CM

## 2018-02-27 NOTE — PATIENT INSTRUCTIONS
Week 38 of Your Pregnancy: Care Instructions  Your Care Instructions    Believe it or not, your baby is almost here. You may have ideas about your baby's personality because of how much he or she moves. Or you may have noticed how he or she responds to sounds, warmth, cold, and light. You may even know what kind of music your baby likes. By now, you have a better idea of what to expect during delivery. You may have talked about your birth preferences with your doctor. But even if you want a vaginal birth, it is a good idea to learn about  births.  birth means that your baby is born through a cut (incision) in your lower belly. It is sometimes the best choice for the health of the baby and the mother. This care sheet can help you understand  births. It also gives you information about what to expect after your baby is born. And it helps you understand more about postpartum depression. Follow-up care is a key part of your treatment and safety. Be sure to make and go to all appointments, and call your doctor if you are having problems. It's also a good idea to know your test results and keep a list of the medicines you take. How can you care for yourself at home? Learn about  birth  · Most C-sections are unplanned. They are done because of problems that occur during labor. These problems might include:  ¨ Labor that slows or stops. ¨ High blood pressure or other problems for the mother. ¨ Signs of distress in the baby. These signs may include a very fast or slow heart rate. · Although most mothers and babies do well after , it is major surgery. It has more risks than a vaginal delivery. · In some cases, a planned  may be safer than a vaginal delivery. This may be the case if:  ¨ The mother has a health problem, such as a heart condition. ¨ The baby isn't in a head-down position for delivery. This is called a breech position.   ¨ The uterus has scars from past surgeries. This could increase the chance of a tear in the uterus. ¨ There is a problem with the placenta. ¨ The mother has an infection, such as genital herpes, that could be spread to the baby. ¨ The mother is having twins or more. ¨ The baby weighs 9 to 10 pounds or more. · Because of the risks of , planned C-sections generally should be done only for medical reasons. And a planned  should be done at 39 weeks or later unless there is a medical reason to do it sooner. Know what to expect after delivery, and plan for the first few weeks at home  · You, your baby, and your partner or  will get identification bands. Only people with matching bands can  the baby from the nursery. · You will learn how to feed, diaper, and bathe your baby. And you will learn how to care for the umbilical cord stump. If your baby will be circumcised, you will also learn how to care for that. · Ask people to wait to visit you until you are at home. And ask them to wash their hands before they touch your baby. · Make sure you have another adult in your home for at least 2 or 3 days after the birth. · During the first 2 weeks, limit when friends and family can visit. · Do not allow visitors who have colds or infections. Make sure all visitors are up to date with their vaccinations. Never let anyone smoke around your baby. · Try to nap when the baby naps. Be aware of postpartum depression  · \"Baby blues\" are common for the first 1 to 2 weeks after birth. You may cry or feel sad or irritable for no reason. · For some women, these feelings last longer and are more intense. This is called postpartum depression. · If your symptoms last for more than a few weeks or you feel very depressed, ask your doctor for help. · Postpartum depression can be treated. Support groups and counseling can help. Sometimes medicine can also help. Where can you learn more?   Go to http://elsy-pacheco.info/. Enter B044 in the search box to learn more about \"Week 38 of Your Pregnancy: Care Instructions. \"  Current as of: March 16, 2017  Content Version: 11.4  © 2115-8913 Healthwise, Incorporated. Care instructions adapted under license by Shopseen (which disclaims liability or warranty for this information). If you have questions about a medical condition or this instruction, always ask your healthcare professional. [address] any warranty or liability for your use of this information.

## 2018-03-05 ENCOUNTER — ROUTINE PRENATAL (OUTPATIENT)
Dept: OBGYN CLINIC | Age: 38
End: 2018-03-05

## 2018-03-05 VITALS — DIASTOLIC BLOOD PRESSURE: 84 MMHG | SYSTOLIC BLOOD PRESSURE: 122 MMHG | WEIGHT: 184.5 LBS | BODY MASS INDEX: 29.78 KG/M2

## 2018-03-05 DIAGNOSIS — Z34.83 PRENATAL CARE, SUBSEQUENT PREGNANCY IN THIRD TRIMESTER: Primary | ICD-10-CM

## 2018-03-05 NOTE — PATIENT INSTRUCTIONS
Week 39 of Your Pregnancy: Care Instructions  Your Care Instructions    During these final weeks, you may feel anxious to see your new baby. [place] babies often look different from what you see in pictures or movies. Right after birth, their heads may have a strange shape. Their eyes may be puffy. And their genitals may be swollen. They may also have very dry skin, or red marks on the eyelids, nose, or neck. Still, most parents think their babies are beautiful. Follow-up care is a key part of your treatment and safety. Be sure to make and go to all appointments, and call your doctor if you are having problems. It's also a good idea to know your test results and keep a list of the medicines you take. How can you care for yourself at home? Prepare to breastfeed  · If you are breastfeeding, continue to eat healthy foods. · Avoid alcohol, cigarettes, and drugs. This includes prescription and over-the-counter medicines. · You can help prevent sore nipples if you feed your baby in the correct position. Nurses will help you learn to do this. · Your  will need to be fed about every 1½ to 3 hours. Choose the right birth control after your baby is born  · Women who are breastfeeding can still get pregnant. Use birth control if you don't want to get pregnant. · Intrauterine devices (IUDs) work for women who want to wait at least 2 years before getting pregnant again. They are safe to use while you are breastfeeding. · Depo-Provera can be used while you are breastfeeding. It is a shot you get every 3 months. · Birth control pills work well. But you need a different kind of pill while you are breastfeeding. And when you start taking these pills, you need to make sure to use another type of birth control until you start your second pack. · Diaphragms, cervical caps, tubal implants, and condoms with spermicide work less well after birth.  If you have a diaphragm or cervical cap, you will need to have it refitted. · Tubal ligation (tying your tubes) and vasectomy are both permanent. These are good options if you are sure you are done having children. Where can you learn more? Go to http://elsy-pacheco.info/. Enter L281 in the search box to learn more about \"Week 39 of Your Pregnancy: Care Instructions. \"  Current as of: March 16, 2017  Content Version: 11.4  © 7073-9806 [employer]ist. Care instructions adapted under license by DentLight (which disclaims liability or warranty for this information). If you have questions about a medical condition or this instruction, always ask your healthcare professional. [address] any warranty or liability for your use of this information.

## 2018-03-08 ENCOUNTER — ANESTHESIA EVENT (OUTPATIENT)
Dept: LABOR AND DELIVERY | Age: 38
End: 2018-03-08
Payer: COMMERCIAL

## 2018-03-08 ENCOUNTER — ROUTINE PRENATAL (OUTPATIENT)
Dept: OBGYN CLINIC | Age: 38
End: 2018-03-08

## 2018-03-08 ENCOUNTER — ANESTHESIA (OUTPATIENT)
Dept: LABOR AND DELIVERY | Age: 38
End: 2018-03-08
Payer: COMMERCIAL

## 2018-03-08 ENCOUNTER — HOSPITAL ENCOUNTER (INPATIENT)
Age: 38
LOS: 2 days | Discharge: HOME | End: 2018-03-10
Attending: OBSTETRICS & GYNECOLOGY | Admitting: OBSTETRICS & GYNECOLOGY
Payer: COMMERCIAL

## 2018-03-08 VITALS — DIASTOLIC BLOOD PRESSURE: 84 MMHG | WEIGHT: 182 LBS | SYSTOLIC BLOOD PRESSURE: 132 MMHG | BODY MASS INDEX: 29.38 KG/M2

## 2018-03-08 DIAGNOSIS — Z34.83 PRENATAL CARE, SUBSEQUENT PREGNANCY IN THIRD TRIMESTER: Primary | ICD-10-CM

## 2018-03-08 PROBLEM — O42.90 PROM (PREMATURE RUPTURE OF MEMBRANES): Status: ACTIVE | Noted: 2018-03-08

## 2018-03-08 LAB
ERYTHROCYTE [DISTWIDTH] IN BLOOD BY AUTOMATED COUNT: 18.1 % (ref 11.5–14.5)
HCT VFR BLD AUTO: 37.2 % (ref 35–47)
HGB BLD-MCNC: 12.1 G/DL (ref 11.5–16)
MCH RBC QN AUTO: 28.3 PG (ref 26–34)
MCHC RBC AUTO-ENTMCNC: 32.5 G/DL (ref 30–36.5)
MCV RBC AUTO: 86.9 FL (ref 80–99)
NRBC # BLD: 0.02 K/UL (ref 0–0.01)
NRBC BLD-RTO: 0.3 PER 100 WBC
PLATELET # BLD AUTO: 164 K/UL (ref 150–400)
PMV BLD AUTO: 12.8 FL (ref 8.9–12.9)
RBC # BLD AUTO: 4.28 M/UL (ref 3.8–5.2)
WBC # BLD AUTO: 7.9 K/UL (ref 3.6–11)

## 2018-03-08 PROCEDURE — A4300 CATH IMPL VASC ACCESS PORTAL: HCPCS

## 2018-03-08 PROCEDURE — 36415 COLL VENOUS BLD VENIPUNCTURE: CPT | Performed by: OBSTETRICS & GYNECOLOGY

## 2018-03-08 PROCEDURE — 85027 COMPLETE CBC AUTOMATED: CPT | Performed by: OBSTETRICS & GYNECOLOGY

## 2018-03-08 PROCEDURE — 77030007880 HC KT SPN EPDRL BBMI -B

## 2018-03-08 PROCEDURE — 0KQM0ZZ REPAIR PERINEUM MUSCLE, OPEN APPROACH: ICD-10-PCS | Performed by: OBSTETRICS & GYNECOLOGY

## 2018-03-08 PROCEDURE — 00HU33Z INSERTION OF INFUSION DEVICE INTO SPINAL CANAL, PERCUTANEOUS APPROACH: ICD-10-PCS | Performed by: ANESTHESIOLOGY

## 2018-03-08 PROCEDURE — 74011250636 HC RX REV CODE- 250/636

## 2018-03-08 PROCEDURE — 75410000000 HC DELIVERY VAGINAL/SINGLE

## 2018-03-08 PROCEDURE — 77030011943

## 2018-03-08 PROCEDURE — 65270000029 HC RM PRIVATE

## 2018-03-08 PROCEDURE — 74011250636 HC RX REV CODE- 250/636: Performed by: OBSTETRICS & GYNECOLOGY

## 2018-03-08 PROCEDURE — 76060000078 HC EPIDURAL ANESTHESIA

## 2018-03-08 PROCEDURE — 74011000250 HC RX REV CODE- 250

## 2018-03-08 PROCEDURE — 74011250636 HC RX REV CODE- 250/636: Performed by: ADVANCED PRACTICE MIDWIFE

## 2018-03-08 PROCEDURE — 75410000002 HC LABOR FEE PER 1 HR

## 2018-03-08 RX ORDER — BUPIVACAINE HYDROCHLORIDE 5 MG/ML
INJECTION, SOLUTION EPIDURAL; INTRACAUDAL AS NEEDED
Status: DISCONTINUED | OUTPATIENT
Start: 2018-03-08 | End: 2018-03-08 | Stop reason: HOSPADM

## 2018-03-08 RX ORDER — HYDROCORTISONE ACETATE PRAMOXINE HCL 2.5; 1 G/100G; G/100G
CREAM TOPICAL AS NEEDED
Status: DISCONTINUED | OUTPATIENT
Start: 2018-03-08 | End: 2018-03-10 | Stop reason: HOSPADM

## 2018-03-08 RX ORDER — TERBUTALINE SULFATE 1 MG/ML
0.25 INJECTION SUBCUTANEOUS AS NEEDED
Status: DISCONTINUED | OUTPATIENT
Start: 2018-03-08 | End: 2018-03-08

## 2018-03-08 RX ORDER — FENTANYL/BUPIVACAINE/NS/PF 2-1250MCG
PREFILLED PUMP RESERVOIR EPIDURAL
Status: COMPLETED
Start: 2018-03-08 | End: 2018-03-08

## 2018-03-08 RX ORDER — IBUPROFEN 400 MG/1
800 TABLET ORAL EVERY 8 HOURS
Status: DISCONTINUED | OUTPATIENT
Start: 2018-03-09 | End: 2018-03-10 | Stop reason: HOSPADM

## 2018-03-08 RX ORDER — FENTANYL CITRATE 50 UG/ML
100 INJECTION, SOLUTION INTRAMUSCULAR; INTRAVENOUS
Status: DISCONTINUED | OUTPATIENT
Start: 2018-03-08 | End: 2018-03-08

## 2018-03-08 RX ORDER — ACETAMINOPHEN 325 MG/1
650 TABLET ORAL
Status: DISCONTINUED | OUTPATIENT
Start: 2018-03-08 | End: 2018-03-10 | Stop reason: HOSPADM

## 2018-03-08 RX ORDER — FENTANYL CITRATE 50 UG/ML
100 INJECTION, SOLUTION INTRAMUSCULAR; INTRAVENOUS ONCE
Status: DISCONTINUED | OUTPATIENT
Start: 2018-03-08 | End: 2018-03-08

## 2018-03-08 RX ORDER — FENTANYL CITRATE 50 UG/ML
INJECTION, SOLUTION INTRAMUSCULAR; INTRAVENOUS AS NEEDED
Status: DISCONTINUED | OUTPATIENT
Start: 2018-03-08 | End: 2018-03-08 | Stop reason: HOSPADM

## 2018-03-08 RX ORDER — EPHEDRINE SULFATE 50 MG/ML
INJECTION, SOLUTION INTRAVENOUS
Status: DISCONTINUED
Start: 2018-03-08 | End: 2018-03-08

## 2018-03-08 RX ORDER — BUPIVACAINE HYDROCHLORIDE 5 MG/ML
INJECTION, SOLUTION EPIDURAL; INTRACAUDAL
Status: COMPLETED
Start: 2018-03-08 | End: 2018-03-08

## 2018-03-08 RX ORDER — NALOXONE HYDROCHLORIDE 0.4 MG/ML
0.4 INJECTION, SOLUTION INTRAMUSCULAR; INTRAVENOUS; SUBCUTANEOUS AS NEEDED
Status: DISCONTINUED | OUTPATIENT
Start: 2018-03-08 | End: 2018-03-10 | Stop reason: HOSPADM

## 2018-03-08 RX ORDER — FENTANYL CITRATE 50 UG/ML
INJECTION, SOLUTION INTRAMUSCULAR; INTRAVENOUS
Status: COMPLETED
Start: 2018-03-08 | End: 2018-03-08

## 2018-03-08 RX ORDER — EPHEDRINE SULFATE 50 MG/ML
12.5 INJECTION, SOLUTION INTRAVENOUS
Status: DISCONTINUED | OUTPATIENT
Start: 2018-03-08 | End: 2018-03-08

## 2018-03-08 RX ORDER — MINERAL OIL
OIL (ML) ORAL
Status: DISCONTINUED
Start: 2018-03-08 | End: 2018-03-08

## 2018-03-08 RX ORDER — NALOXONE HYDROCHLORIDE 0.4 MG/ML
0.4 INJECTION, SOLUTION INTRAMUSCULAR; INTRAVENOUS; SUBCUTANEOUS AS NEEDED
Status: DISCONTINUED | OUTPATIENT
Start: 2018-03-08 | End: 2018-03-08

## 2018-03-08 RX ORDER — FENTANYL/BUPIVACAINE/NS/PF 2-1250MCG
1-16 PREFILLED PUMP RESERVOIR EPIDURAL CONTINUOUS
Status: DISCONTINUED | OUTPATIENT
Start: 2018-03-08 | End: 2018-03-08

## 2018-03-08 RX ORDER — OXYTOCIN/RINGER'S LACTATE 20/1000 ML
125-500 PLASTIC BAG, INJECTION (ML) INTRAVENOUS ONCE
Status: ACTIVE | OUTPATIENT
Start: 2018-03-09 | End: 2018-03-09

## 2018-03-08 RX ORDER — SODIUM CHLORIDE, SODIUM LACTATE, POTASSIUM CHLORIDE, CALCIUM CHLORIDE 600; 310; 30; 20 MG/100ML; MG/100ML; MG/100ML; MG/100ML
125 INJECTION, SOLUTION INTRAVENOUS CONTINUOUS
Status: DISCONTINUED | OUTPATIENT
Start: 2018-03-08 | End: 2018-03-08

## 2018-03-08 RX ORDER — NALBUPHINE HYDROCHLORIDE 10 MG/ML
10 INJECTION, SOLUTION INTRAMUSCULAR; INTRAVENOUS; SUBCUTANEOUS
Status: DISCONTINUED | OUTPATIENT
Start: 2018-03-08 | End: 2018-03-08

## 2018-03-08 RX ORDER — CALCIUM CARBONATE 200(500)MG
1 TABLET,CHEWABLE ORAL DAILY
COMMUNITY
End: 2020-06-09

## 2018-03-08 RX ORDER — OXYTOCIN IN 5 % DEXTROSE 30/500 ML
1-25 PLASTIC BAG, INJECTION (ML) INTRAVENOUS
Status: DISCONTINUED | OUTPATIENT
Start: 2018-03-08 | End: 2018-03-08

## 2018-03-08 RX ORDER — BUPIVACAINE HYDROCHLORIDE 5 MG/ML
30 INJECTION, SOLUTION EPIDURAL; INTRACAUDAL AS NEEDED
Status: DISCONTINUED | OUTPATIENT
Start: 2018-03-08 | End: 2018-03-08

## 2018-03-08 RX ADMIN — BUPIVACAINE HYDROCHLORIDE 6 ML: 5 INJECTION, SOLUTION EPIDURAL; INTRACAUDAL at 20:45

## 2018-03-08 RX ADMIN — Medication 10 ML/HR: at 20:49

## 2018-03-08 RX ADMIN — FENTANYL CITRATE 100 MCG: 50 INJECTION, SOLUTION INTRAMUSCULAR; INTRAVENOUS at 20:45

## 2018-03-08 RX ADMIN — Medication 2 MILLI-UNITS/MIN: at 16:50

## 2018-03-08 RX ADMIN — SODIUM CHLORIDE, SODIUM LACTATE, POTASSIUM CHLORIDE, AND CALCIUM CHLORIDE 125 ML/HR: 600; 310; 30; 20 INJECTION, SOLUTION INTRAVENOUS at 16:50

## 2018-03-08 RX ADMIN — SODIUM CHLORIDE, SODIUM LACTATE, POTASSIUM CHLORIDE, AND CALCIUM CHLORIDE 500 ML: 600; 310; 30; 20 INJECTION, SOLUTION INTRAVENOUS at 20:33

## 2018-03-08 NOTE — IP AVS SNAPSHOT
[address] [facility] [address] 
[phone] Patient: Ney Jiménez MRN: [id number] :1980 About your hospitalization You were admitted on:  2018 You last received care in the:  [facility] You were discharged on:  March 10, 2018 Why you were hospitalized Your primary diagnosis was:  Not on File Your diagnoses also included:  Prom (Premature Rupture Of Membranes) Follow-up Information Follow up With Details Comments Contact Info Alexis Marsh NP   [address] [facility] [address] [place] [address] 
[phone] Angelica Diaz MD In 6 weeks  [place] [address] [address] [facility] 
[phone] Discharge Orders None A check callie indicates which time of day the medication should be taken. My Medications START taking these medications Instructions Each Dose to Equal  
 Morning Noon Evening Bedtime  
 ibuprofen 800 mg tablet Commonly known as:  MOTRIN Your last dose was: Your next dose is: Take 1 Tab by mouth every eight (8) hours. 800 mg CONTINUE taking these medications Instructions Each Dose to Equal  
 Morning Noon Evening Bedtime  
 calcium carbonate 200 mg calcium (500 mg) Chew Commonly known as:  TUMS Your last dose was: Your next dose is: Take 1 Tab by mouth daily. 1 Tab FLORANEX PO Your last dose was: Your next dose is: Take  by mouth. PNV No12-Iron-FA-DSS-OM-3 29 mg iron-1 mg -50 mg Cpkd Your last dose was: Your next dose is: Take  by mouth. Where to Get Your Medications Information on where to get these meds will be given to you by the nurse or doctor. ! Ask your nurse or doctor about these medications  
  ibuprofen 800 mg tablet Discharge Instructions Postpartum: Care Instructions Your Care Instructions After childbirth (postpartum period), your body goes through many changes. Some of these changes happen over several weeks. In the hours after delivery, your body will begin to recover from childbirth while it prepares to breastfeed your . You may feel emotional during this time. Your hormones can shift your mood without warning for no clear reason. In the first couple of weeks after childbirth, many women have emotions that change from happy to sad. You may find it hard to sleep. You may cry a lot. This is called the \"baby blues. \" These overwhelming emotions often go away within a couple of days or weeks. But it's important to discuss your feelings with your doctor. It is easy to get too tired and overwhelmed during the first weeks after childbirth. Don't try to do too much. Get rest whenever you can, accept help from others, and eat well and drink plenty of fluids. About 4 to 6 weeks after your baby's birth, you will have a follow-up visit with your doctor. This visit is your time to talk to your doctor about anything you are concerned or curious about. Follow-up care is a key part of your treatment and safety. Be sure to make and go to all appointments, and call your doctor if you are having problems. It's also a good idea to know your test results and keep a list of the medicines you take. How can you care for yourself at home? · Sleep or rest when your baby sleeps. · Get help with household chores from family or friends, if you can. Do not try to do it all yourself. · If you have hemorrhoids or swelling or pain around the opening of your vagina, try using cold and heat. You can put ice or a cold pack on the area for 10 to 20 minutes at a time.  Put a thin cloth between the ice and your skin. Also try sitting in a few inches of warm water (sitz bath) 3 times a day and after bowel movements. · Take pain medicines exactly as directed. ¨ If the doctor gave you a prescription medicine for pain, take it as prescribed. ¨ If you are not taking a prescription pain medicine, ask your doctor if you can take an over-the-counter medicine. · Eat more fiber to avoid constipation. Include foods such as whole-grain breads and cereals, raw vegetables, raw and dried fruits, and beans. · Drink plenty of fluids, enough so that your urine is light yellow or clear like water. If you have kidney, heart, or liver disease and have to limit fluids, talk with your doctor before you increase the amount of fluids you drink. · Do not rinse inside your vagina with fluids (douche). · If you have stitches, keep the area clean by pouring or spraying warm water over the area outside your vagina and anus after you use the toilet. · Keep a list of questions to bring to your postpartum visit. Your questions might be about: 
¨ Changes in your breasts, such as lumps or soreness. ¨ When to expect your menstrual period to start again. ¨ What form of birth control is best for you. ¨ Weight you have put on during the pregnancy. ¨ Exercise options. ¨ What foods and drinks are best for you, especially if you are breastfeeding. ¨ Problems you might be having with breastfeeding. ¨ When you can have sex. Some women may want to talk about lubricants for the vagina. ¨ Any feelings of sadness or restlessness that you are having. When should you call for help? Call 911 anytime you think you may need emergency care. For example, call if: 
? · You have thoughts of harming yourself, your baby, or another person. ? · You passed out (lost consciousness). ?Call your doctor now or seek immediate medical care if: 
? · Your vaginal bleeding seems to be getting heavier. ? · You are dizzy or lightheaded, or you feel like you may faint. ? · You have a fever. ? Watch closely for changes in your health, and be sure to contact your doctor if: 
? · You have new or worse vaginal discharge. ? · You feel sad or depressed. ? · You are having problems with your breasts or breastfeeding. Where can you learn more? Go to http://elsy-pacheco.info/. Enter M529 in the search box to learn more about \"Postpartum: Care Instructions. \" Current as of: March 16, 2017 Content Version: 11.4 © 7537-7590 Death by Party. Care instructions adapted under license by eyeOS (which disclaims liability or warranty for this information). If you have questions about a medical condition or this instruction, always ask your healthcare professional. [address] any warranty or liability for your use of this information. Introducing [facility]! Dear Yany Her: Thank you for requesting a [employer] account. Our records indicate that you already have an active [employer] account. You can access your account anytime at [email or web address] Did you know that you can access your hospital and ER discharge instructions at any time in [employer]? You can also review all of your test results from your hospital stay or ER visit. Additional Information If you have questions, please visit the Frequently Asked Questions section of the [employer] website at [email or web address] Remember, [employer] is NOT to be used for urgent needs. For medical emergencies, dial 911. Now available from your iPhone and Android! Providers Seen During Your Hospitalization Provider Specialty Primary office phone Kit French MD Obstetrics & Gynecology [phone] Your Primary Care Physician (PCP) Primary Care Physician Office Phone Office Fax  Neda WILLARD [phone] ** None **  
 You are allergic to the following No active allergies Recent Documentation Height Weight Breastfeeding? BMI OB Status Smoking Status 1.676 m 82.6 kg Unknown 29.38 kg/m2 Recent pregnancy Never Smoker Emergency Contacts Name Discharge Info Relation Home Work Mobile Zafar Salazar DISCHARGE CAREGIVER [3] Spouse [3] [phone] Patient Belongings The following personal items are in your possession at time of discharge: 
  Dental Appliances: None  Visual Aid: None      Home Medications: None   Jewelry: None  Clothing: At bedside    Other Valuables: At bedside, Camera, Cell Phone  Personal Items Sent to Safe: none Please provide this summary of care documentation to your next provider. Signatures-by signing, you are acknowledging that this After Visit Summary has been reviewed with you and you have received a copy. Patient Signature:  ____________________________________________________________ Date:  ____________________________________________________________  
  
Jane Medico Provider Signature:  ____________________________________________________________ Date:  ____________________________________________________________

## 2018-03-08 NOTE — PATIENT INSTRUCTIONS
Week 39 of Your Pregnancy: Care Instructions  Your Care Instructions    During these final weeks, you may feel anxious to see your new baby. [place] babies often look different from what you see in pictures or movies. Right after birth, their heads may have a strange shape. Their eyes may be puffy. And their genitals may be swollen. They may also have very dry skin, or red marks on the eyelids, nose, or neck. Still, most parents think their babies are beautiful. Follow-up care is a key part of your treatment and safety. Be sure to make and go to all appointments, and call your doctor if you are having problems. It's also a good idea to know your test results and keep a list of the medicines you take. How can you care for yourself at home? Prepare to breastfeed  · If you are breastfeeding, continue to eat healthy foods. · Avoid alcohol, cigarettes, and drugs. This includes prescription and over-the-counter medicines. · You can help prevent sore nipples if you feed your baby in the correct position. Nurses will help you learn to do this. · Your  will need to be fed about every 1½ to 3 hours. Choose the right birth control after your baby is born  · Women who are breastfeeding can still get pregnant. Use birth control if you don't want to get pregnant. · Intrauterine devices (IUDs) work for women who want to wait at least 2 years before getting pregnant again. They are safe to use while you are breastfeeding. · Depo-Provera can be used while you are breastfeeding. It is a shot you get every 3 months. · Birth control pills work well. But you need a different kind of pill while you are breastfeeding. And when you start taking these pills, you need to make sure to use another type of birth control until you start your second pack. · Diaphragms, cervical caps, tubal implants, and condoms with spermicide work less well after birth.  If you have a diaphragm or cervical cap, you will need to have it refitted. · Tubal ligation (tying your tubes) and vasectomy are both permanent. These are good options if you are sure you are done having children. Where can you learn more? Go to http://elsy-pacheco.info/. Enter Q431 in the search box to learn more about \"Week 39 of Your Pregnancy: Care Instructions. \"  Current as of: March 16, 2017  Content Version: 11.4  © 1559-1113 ShopWell. Care instructions adapted under license by Movatu (which disclaims liability or warranty for this information). If you have questions about a medical condition or this instruction, always ask your healthcare professional. [address] any warranty or liability for your use of this information.

## 2018-03-08 NOTE — PROGRESS NOTES
Reports SROM with clear AF @ 0730 this am and no SOL. GFM. Clear AF noted on perineum. Nitrazine pos. Exam deferred. Placed on EFM for NST. If reactive will allow pt to return home until labor starts or this afternoon. States had to be induced with PROM last pregnancy and would like to avoid if possible. NST procedure note    Juve Boyce is a ,  44 y.o. female [facility] whose Patient's last menstrual period was 2017. was on  who presents for fetal non-stress test.    She is 39w1d and was monitored for 20 minutes and the FHR was reactive, with accelerations. NST Interpretation:    FHR baseline 150 bpm, variability moderate, accelerations present, decelerations Absent. Uterine contractions were absent. Penny Alberto was informed of the NST results and her questions were answered. Informed to come in this afternoon if no labor signs, or when labor starts. [address]    Nothing in vagina

## 2018-03-08 NOTE — IP AVS SNAPSHOT
[address] [facility] [address] 
[phone] Patient: Brady Patel MRN: [id number] :1980 A check callie indicates which time of day the medication should be taken. My Medications START taking these medications Instructions Each Dose to Equal  
 Morning Noon Evening Bedtime  
 ibuprofen 800 mg tablet Commonly known as:  MOTRIN Your last dose was: Your next dose is: Take 1 Tab by mouth every eight (8) hours. 800 mg CONTINUE taking these medications Instructions Each Dose to Equal  
 Morning Noon Evening Bedtime  
 calcium carbonate 200 mg calcium (500 mg) Chew Commonly known as:  TUMS Your last dose was: Your next dose is: Take 1 Tab by mouth daily. 1 Tab FLORANEX PO Your last dose was: Your next dose is: Take  by mouth. PNV No12-Iron-FA-DSS-OM-3 29 mg iron-1 mg -50 mg Cpkd Your last dose was: Your next dose is: Take  by mouth. Where to Get Your Medications Information on where to get these meds will be given to you by the nurse or doctor. ! Ask your nurse or doctor about these medications  
  ibuprofen 800 mg tablet

## 2018-03-08 NOTE — H&P
History & Physical    Name: Maria Elena Lugo MRN: [id number]  SSN: [id number]    YOB: 1980  Age: 44 y.o. Sex: female        Subjective:     Estimated Date of Delivery: 3/14/18  OB History      Para Term  AB Living    5 2 2   2    SAB TAB Ectopic Molar Multiple Live Births         2          Ms. Kyara Chacko is admitted with pregnancy at 36w3d for PROM, Term no labor. Prenatal course was normal. Please see prenatal records for details. Patient Active Problem List    Diagnosis    Prenatal care, subsequent pregnancy in first trimester     Primary provider:  Dr Kami Becerra! 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 - \"fausto\"  Last pap 14 WNL HPV Neg  Past Medical History:   Diagnosis Date    Essential hypertension      1 st pregnancy few high blood pressure, 24 hour urine normal - few days before delivery    H/O maternal fourth degree perineal laceration, currently pregnant 10/7/2012    History of PCOS     Infertility, female     clomid pregnancy    Polycystic disease, ovaries     previously on metformin    Postpartum depression     saw a counselor     Past Surgical History:   Procedure Laterality Date    HX GYN      fourth degree laceration     Social History     Occupational History    Chaplain [employer]     Social History Main Topics    Smoking status: Never Smoker    Smokeless tobacco: Never Used    Alcohol use 0.0 oz/week      Comment: socially     Drug use: No    Sexual activity: Yes     Partners: Male     Birth control/ protection: None     Family History   Problem Relation Age of Onset    Hypertension Father     Heart Disease Father     Diabetes Father      Insluin dependendent (early onset)    Colon Cancer Maternal Grandmother [de-identified]    Cancer Maternal Grandmother      colon    Cancer Maternal Grandfather 58     Melanoma       No Known Allergies  Prior to Admission medications    Medication Sig Start Date End Date Taking? Authorizing Provider   L. ACIDOPHILUS/L.BULGARICUS (FLORANEX PO) Take  by mouth. Historical Provider   PNV No12-Iron-FA-DSS-OM-3 29 mg iron-1 mg -50 mg CPKD Take  by mouth. Historical Provider        Review of Systems: A comprehensive review of systems was negative except for that written in the HPI. Objective:     Vitals: There were no vitals filed for this visit. Physical Exam:  Patient without distress. Abdomen: soft, nontender  Fundus: soft and non tender  Perineum: blood absent, amniotic fluid present  Cervical Exam: 3 cm dilated    70% effaced    -1 station    Presenting Part: cephalic  Cervical Position: posterior  Lower Extremities:  - Edema No  Membranes:  Premature Rupture of Membranes; Amniotic Fluid: clear fluid  Fetal Heart Rate: Baseline: 135 per minute  Variability: moderate, Cat I  Accelerations: yes  Decelerations: none  Uterine contractions: none    Prenatal Labs:   Lab Results   Component Value Date/Time    Rubella, External 2.10 Immune 06/30/2014 12:00 PM    GrBStrep, External negative 02/13/2018    HBsAg, External Negative  06/30/2014 12:00 PM    HIV, External <1.00 Non Reactive  06/30/2014 12:00 PM    RPR, External non-reactive 02/24/2012 01:37 PM        Assessment/Plan:     Plan: Admit for Reassuring fetal status, Pitocin Augmentation for PROM @ term no labor. Group B Strep was negative.     Signed By:  Hawa Heck NP     March 8, 2018

## 2018-03-09 PROCEDURE — 74011250637 HC RX REV CODE- 250/637: Performed by: ADVANCED PRACTICE MIDWIFE

## 2018-03-09 PROCEDURE — 75410000003 HC RECOV DEL/VAG/CSECN EA 0.5 HR

## 2018-03-09 PROCEDURE — 65410000002 HC RM PRIVATE OB

## 2018-03-09 RX ORDER — DOCUSATE SODIUM 100 MG/1
100 CAPSULE, LIQUID FILLED ORAL DAILY
Status: DISCONTINUED | OUTPATIENT
Start: 2018-03-10 | End: 2018-03-10 | Stop reason: SDUPTHER

## 2018-03-09 RX ORDER — SODIUM CHLORIDE 0.9 % (FLUSH) 0.9 %
SYRINGE (ML) INJECTION
Status: DISPENSED
Start: 2018-03-09 | End: 2018-03-09

## 2018-03-09 RX ADMIN — IBUPROFEN 800 MG: 400 TABLET ORAL at 03:09

## 2018-03-09 RX ADMIN — ACETAMINOPHEN 650 MG: 325 TABLET, FILM COATED ORAL at 14:21

## 2018-03-09 RX ADMIN — IBUPROFEN 800 MG: 400 TABLET ORAL at 10:39

## 2018-03-09 RX ADMIN — IBUPROFEN 800 MG: 400 TABLET ORAL at 19:09

## 2018-03-09 NOTE — PROGRESS NOTES
Up @ Bedside, rocking with UC's and coping well  Denies urge to push, but reports increase in pressure with UC  Pitocin @ 12 mill units/min  O:  Visit Vitals    /87    Pulse 85    Temp 98.2 °F (36.8 °C)    Resp 18    Ht 5' 6\" (1.676 m)    Wt 182 lb (82.6 kg)    LMP 2017    Breastfeeding No    BMI 29.38 kg/m2     FHR: Cat I Reactive 135 no decels  [place]: Q 2-3  Pelvic: deferred  A/P: Term IUP with SROM and Augmentation with Pitocin  Continue with Pitocin titration  Anticipate     SIRIA Warner/CEDRIC

## 2018-03-09 NOTE — LACTATION NOTE
This note was copied from a baby's chart. Made initial lactation visit to G5 now P3 mother whose infant was born vaginally last night at 36wd2. Mother nursed 2 other boys. Mother said she did not have enough milk to exclusively breastfeed her first son but did have enough to exclusively breastfeed her second child for about 9 months. Mother denies any pertinent medical history. This infant nursed while I was in the room. He was sucking rhythmically and swallowing during feeding. Feeding Plan: Mother will keep baby skin to skin as often as possible, feed on demand, respond to feeding cues, obtain latch, listen for audible swallowing, be aware of signs of oxytocin release/ cramping,thrist,sleepyness while breastfeeding, offer both breasts,and will not limit feedings.

## 2018-03-09 NOTE — ANESTHESIA PROCEDURE NOTES
Epidural Block    Start time: 3/8/2018 8:36 PM  End time: 3/8/2018 8:45 PM  Performed by: Leroy Murphy  Authorized by: Juan Daniel WILLARD     Pre-Procedure  Indication: labor epidural    Preanesthetic Checklist: risks and benefits discussed and timeout performed    Timeout Time: 20:36        Epidural:   Patient position:  Left lateral decubitus  Prep region:  Lumbar  Prep: Chlorhexidine    Location:  L2-3    Needle and Epidural Catheter:   Needle Type:  Tuohy  Needle Gauge:  17 G  Injection Technique:  Loss of resistance using air  Attempts:  1  Catheter Size:  19 G  Events: no blood with aspiration, no cerebrospinal fluid with aspiration, no paresthesia and negative aspiration test    Test Dose:  Bupivacaine 0.25% and negative    Assessment:   Catheter Secured:  Tegaderm and tape  Insertion:  Uncomplicated  Patient tolerance:  Patient tolerated the procedure well with no immediate complications

## 2018-03-09 NOTE — ANESTHESIA PREPROCEDURE EVALUATION
Anesthetic History   No history of anesthetic complications            Review of Systems / Medical History  Patient summary reviewed, nursing notes reviewed and pertinent labs reviewed    Pulmonary  Within defined limits                 Neuro/Psych   Within defined limits           Cardiovascular                  Exercise tolerance: >4 METS     GI/Hepatic/Renal  Within defined limits              Endo/Other  Within defined limits           Other Findings   Comments: Missed AB           Physical Exam    Airway  Mallampati: II  TM Distance: > 6 cm  Neck ROM: normal range of motion   Mouth opening: Normal     Cardiovascular  Regular rate and rhythm,  S1 and S2 normal,  no murmur, click, rub, or gallop             Dental  No notable dental hx       Pulmonary  Breath sounds clear to auscultation               Abdominal  GI exam deferred       Other Findings            Anesthetic Plan    ASA: 1  Anesthesia type: epidural          Induction: Intravenous  Anesthetic plan and risks discussed with: Patient

## 2018-03-09 NOTE — PROGRESS NOTES
S/P Epidural, comfortable  Pitocin @ 12 millu/min  Was feeling pressure when pt on side, now on back, subsided    O:   Visit Vitals    /85    Pulse 96    Temp 98 °F (36.7 °C)    Resp 16    Ht 5' 6\" (1.676 m)    Wt 182 lb (82.6 kg)    LMP 2017    SpO2 (!) 84%    Breastfeeding No    BMI 29.38 kg/m2     FHR: Cat I Reactive 135 no decels  [place]: Q 2  SVE: 10/100/+2  A/P:  Trial push with one UC, pt unable to feel UC with ineffective Push  Plan to labor down until feels urge then will resume pushing  Anticipate     Louisa Shore, SIRIA/CEDRIC

## 2018-03-09 NOTE — L&D DELIVERY NOTE
Delivery Summary    Became Complete and +2, with strong urge to push. Pushed for ~20 min.  Live Male infant. Over 2nd degree lac perineum. Noted Cord Avulsion @ delivery, immediately clamped with minimal blood loss. Infant placed to maternal abdomen. Infant dried and stimulated, spontaneous cry. Cord allowed to cease pulsations, then doubly clamped and cut per N/A. 3VC. Cordblood Obtained no. Placenta and cord, spont Janki, Intact. Repair with 3-0 Chromic in usual fashion.  . All counts correct yes. To RR, Stable. Patient: Duane Willett MRN: [id number]  SSN: [id number]    YOB: 1980  Age: 44 y.o. Sex: female        Labor Events:    Labor: No    Rupture Date: 3/8/2018    Rupture Time: 7:00 AM    Rupture Type SROM    Amniotic Fluid Volume:      Amniotic Fluid Description: Clear  None    Induction: None        Augmentation: Oxytocin    Labor Complications: None     Additional Complications:        Cervical Ripening:       None      Delivery Events:  Episiotomy: None    Laceration(s): Second degree perineal      Repaired: Yes     Number of Repair Packets: 2    Suture Type and Size:         Estimated Blood Loss (ml): 300        Information for the patient's :  [facility] [[id number]]     Delivery Summary - Baby    Delivery Date: 3/8/2018   Delivery Time: 11:11 PM   Delivery Type: Vaginal, Spontaneous Delivery  Sex:  male  Gestational Age: 36w3d  Delivery Clinician:  Tye Orellana  Living?: Living   Delivery Location: L&D L&D 11           APGARS  One minute Five minutes Ten minutes   Skin Color: 0    0       Heart Rate: 2   2         Reflex Irritability: 2   2         Muscle Tone: 2   2       Respiration: 2   2         Total: 8   8           Presentation: Vertex  Position: Left Occiput Anterior  Resuscitation Method:  Tactile Stimulation;Suctioning-bulb     Meconium Stained: None    Cord Information: 3 Vessels   Complications:  Other(Comment) (Comment)  Cord Blood Sent?:  No    Blood Gases Sent?:  No    Placenta:  Date/Time: 3/8 11:21 PM  Removal: Spontaneous      Appearance: Normal      Measurements:  Birth Weight:      Birth Length:     Head Circumference:       Chest Circumference:      Abdominal Girth:       Other Providers:   Annamarie WILLARD;Corinne JOYA Primary Nurse;Primary  Nurse;Midwife           Cord Blood Results:  Information for the patient's :  [facility] [[id number][facility]   No results found for: ABORH, PCTABR, PCTDIG, BILI, ABORHEXT, ABORH    Information for the patient's :  [facility] [[id number][facility]   No results found for: APH, APCO2, APO2, AHCO3, ABEC, ABDC, O2ST, SITE, RSCOM, PHI, PCO2I, PO2I, HCO3I, SO2I, IBD     Information for the patient's :  [facility] [[id number]]   No results found for: EPHV, PCO2V, PO2V, HCO3V, O2STV, EBDV

## 2018-03-09 NOTE — PROGRESS NOTES
: Bedside and Verbal shift change report given to KENNA Bartholomew (oncoming nurse) by Jade Barfield RN (offgoing nurse). Report included the following information SBAR, Intake/Output, MAR and Recent Results. : Tab Esparza CNM in room to assess pt. Trial push performed, decision made to labor down. 65:  of live male  by CHLOE Bolton. APGARS 8/8. Cord avulsed at delivery. 0140: TRANSFER - OUT REPORT:    Verbal report given to COURTNEY Quiroz RN(name) on [employer]  being transferred to MIU(unit) for routine progression of care       Report consisted of patients Situation, Background, Assessment and   Recommendations(SBAR). Information from the following report(s) SBAR, Intake/Output, MAR and Recent Results was reviewed with the receiving nurse. Lines:   Peripheral IV 18 (Active)   Site Assessment Clean, dry, & intact 3/8/2018  7:52 PM   Phlebitis Assessment 0 3/8/2018  7:52 PM   Infiltration Assessment 0 3/8/2018  7:52 PM   Dressing Status Clean, dry, & intact 3/8/2018  7:52 PM   Dressing Type Tape;Transparent 3/8/2018  7:52 PM   Hub Color/Line Status Pink; Infusing 3/8/2018  7:52 PM        Opportunity for questions and clarification was provided.       Patient transported with:   Registered Nurse

## 2018-03-09 NOTE — PROGRESS NOTES
0300 Up to bathroom to void,became faint and assisted back to bed. Due to void by 0500.  0450 up to bathroom without problem, voided large amount,check void by 11 am.

## 2018-03-09 NOTE — PROGRESS NOTES
S/P , PPday #1  Ambulating and voiding without diff  Barrett po and po meds well  Breastfeeding well established  Would like infant circumcised and early discharge today if possible    O: VSS, Afebrile       Patient Vitals for the past 24 hrs:   Temp Pulse Resp BP SpO2   18 0300 98.4 °F (36.9 °C) 86 16 116/72 -   18 0150 98.1 °F (36.7 °C) 86 16 122/79 -   18 0118 97.7 °F (36.5 °C) 81 - 123/69 -   18 0110 - 70 - 117/70 -   18 0048 - 78 - 113/62 -   18 0033 - 80 - 116/61 -   18 0018 - 88 - 118/60 -   18 0004 - 92 - 132/62 -   18 2348 - 86 - 111/68 -   18 2333 98.2 °F (36.8 °C) 82 16 126/76 -   18 - - - - 98 %   18 - - - - 98 %   18 - - - - 99 %   18 - - - - 100 %   18 - - - - 97 %   18 97.9 °F (36.6 °C) 96 16 139/85 -   18 - - - - 99 %   18 - - - - 100 %   18 - - - - 91 %   18 - - - - 98 %   18 - - - - 99 %   18 - - - - 100 %   18 - - - - 99 %   18 - 82 - 111/59 -   18 - - - - 98 %   18 - - - - 100 %   18 - 70 - 110/59 99 %   18 - - - - 99 %   18 - - - - 99 %   18 - - - - 98 %   18 - 78 - 107/64 -   18 - - - - 97 %   18 - 86 - 106/59 -   18 - - - - 97 %   18 - 83 - 107/59 -   18 - - - - 98 %   18 - 81 - 119/65 -   18 - - - - 98 %   18 - 86 - 119/60 -   18 - - - - 99 %   18 - - - - 100 %   18 - - - - 99 %   18 - 96 - 135/69 -   18 - - - - 98 %   18 - - - - 95 %   182 98 °F (36.7 °C) 85 16 141/87 -   18 1724 98.2 °F (36.8 °C) 78 18 143/83 -   18 1611 - 92 - 132/82 -           Breasts soft, NT        FF @ U-1 ML, Lochia Small Rubra        Perineum Intact Ext NT, No REP, Neg Papo's    A/P: Routine PP care          Maternal Education          Lactation consultation prn          Plan Discharge this PM if infant is discharged          Circumcision consent obtained    Justin Lott.  SIRIA Mesa/CEDRIC

## 2018-03-09 NOTE — ROUTINE PROCESS
TRANSFER - IN REPORT:    Verbal report received from KENNA De Los Santos RN(name) on [employer]  being received from L&D(unit) for routine progression of care      Report consisted of patients Situation, Background, Assessment and   Recommendations(SBAR). Information from the following report(s) SBAR, Procedure Summary and Intake/Output was reviewed with the receiving nurse. Opportunity for questions and clarification was provided. Assessment completed upon patients arrival to unit and care assumed.

## 2018-03-10 VITALS
DIASTOLIC BLOOD PRESSURE: 80 MMHG | RESPIRATION RATE: 16 BRPM | SYSTOLIC BLOOD PRESSURE: 125 MMHG | HEART RATE: 100 BPM | TEMPERATURE: 97.6 F | WEIGHT: 182 LBS | OXYGEN SATURATION: 98 % | HEIGHT: 66 IN | BODY MASS INDEX: 29.25 KG/M2

## 2018-03-10 PROCEDURE — 74011250637 HC RX REV CODE- 250/637: Performed by: ADVANCED PRACTICE MIDWIFE

## 2018-03-10 RX ORDER — IBUPROFEN 800 MG/1
800 TABLET ORAL EVERY 8 HOURS
Qty: 30 TAB | Refills: 1 | Status: SHIPPED | OUTPATIENT
Start: 2018-03-10 | End: 2018-04-17

## 2018-03-10 RX ORDER — DOCUSATE SODIUM 100 MG/1
100 CAPSULE, LIQUID FILLED ORAL DAILY
Status: DISCONTINUED | OUTPATIENT
Start: 2018-03-10 | End: 2018-03-10 | Stop reason: HOSPADM

## 2018-03-10 RX ADMIN — IBUPROFEN 800 MG: 400 TABLET ORAL at 10:48

## 2018-03-10 RX ADMIN — IBUPROFEN 800 MG: 400 TABLET ORAL at 02:51

## 2018-03-10 RX ADMIN — DOCUSATE SODIUM 100 MG: 100 CAPSULE, LIQUID FILLED ORAL at 08:46

## 2018-03-10 NOTE — PROGRESS NOTES
S/P , PPD#2  Ambulating and Voiding without diff  Barrett po and po meds well  Breastfeeding well established  Desires discharge home    O:  A,A&O x 3        VSS, Afebrile       Patient Vitals for the past 24 hrs:   Temp Pulse Resp BP SpO2   03/10/18 0837 97.6 °F (36.4 °C) - 16 125/80 98 %   03/10/18 0249 97.7 °F (36.5 °C) 75 16 133/79 -   18 2052 98.7 °F (37.1 °C) 89 16 114/70 -   18 1753 98.3 °F (36.8 °C) 81 16 121/74 -   18 1054 97.4 °F (36.3 °C) 76 16 126/70 -          Breasts soft, NT       Abd soft, NT/ND       FF@ U-1, ML       Scant Lochia Rubra       Perineum Intact       Ext without REP, Neg Papo's    A/P: Routine PP care          Discharge home in stable cond. RTO 6 wks for PP exam, sooner prn         SIRIA Brown/CEDRIC

## 2018-03-10 NOTE — ROUTINE PROCESS
Bedside shift change report given to TERESSA Murillo (oncoming nurse) by Rosalba Arambula RN (offgoing nurse). Report included the following information SBAR.     1115-Pt discharged home with family. Discharge instructions and medication administration times given. Pt verbalized understanding.

## 2018-03-10 NOTE — DISCHARGE INSTRUCTIONS
Postpartum: Care Instructions  Your Care Instructions    After childbirth (postpartum period), your body goes through many changes. Some of these changes happen over several weeks. In the hours after delivery, your body will begin to recover from childbirth while it prepares to breastfeed your . You may feel emotional during this time. Your hormones can shift your mood without warning for no clear reason. In the first couple of weeks after childbirth, many women have emotions that change from happy to sad. You may find it hard to sleep. You may cry a lot. This is called the \"baby blues. \" These overwhelming emotions often go away within a couple of days or weeks. But it's important to discuss your feelings with your doctor. It is easy to get too tired and overwhelmed during the first weeks after childbirth. Don't try to do too much. Get rest whenever you can, accept help from others, and eat well and drink plenty of fluids. About 4 to 6 weeks after your baby's birth, you will have a follow-up visit with your doctor. This visit is your time to talk to your doctor about anything you are concerned or curious about. Follow-up care is a key part of your treatment and safety. Be sure to make and go to all appointments, and call your doctor if you are having problems. It's also a good idea to know your test results and keep a list of the medicines you take. How can you care for yourself at home? · Sleep or rest when your baby sleeps. · Get help with household chores from family or friends, if you can. Do not try to do it all yourself. · If you have hemorrhoids or swelling or pain around the opening of your vagina, try using cold and heat. You can put ice or a cold pack on the area for 10 to 20 minutes at a time. Put a thin cloth between the ice and your skin. Also try sitting in a few inches of warm water (sitz bath) 3 times a day and after bowel movements.   · Take pain medicines exactly as directed. ¨ If the doctor gave you a prescription medicine for pain, take it as prescribed. ¨ If you are not taking a prescription pain medicine, ask your doctor if you can take an over-the-counter medicine. · Eat more fiber to avoid constipation. Include foods such as whole-grain breads and cereals, raw vegetables, raw and dried fruits, and beans. · Drink plenty of fluids, enough so that your urine is light yellow or clear like water. If you have kidney, heart, or liver disease and have to limit fluids, talk with your doctor before you increase the amount of fluids you drink. · Do not rinse inside your vagina with fluids (douche). · If you have stitches, keep the area clean by pouring or spraying warm water over the area outside your vagina and anus after you use the toilet. · Keep a list of questions to bring to your postpartum visit. Your questions might be about:  ¨ Changes in your breasts, such as lumps or soreness. ¨ When to expect your menstrual period to start again. ¨ What form of birth control is best for you. ¨ Weight you have put on during the pregnancy. ¨ Exercise options. ¨ What foods and drinks are best for you, especially if you are breastfeeding. ¨ Problems you might be having with breastfeeding. ¨ When you can have sex. Some women may want to talk about lubricants for the vagina. ¨ Any feelings of sadness or restlessness that you are having. When should you call for help? Call 911 anytime you think you may need emergency care. For example, call if:  ? · You have thoughts of harming yourself, your baby, or another person. ? · You passed out (lost consciousness). ?Call your doctor now or seek immediate medical care if:  ? · Your vaginal bleeding seems to be getting heavier. ? · You are dizzy or lightheaded, or you feel like you may faint. ? · You have a fever. ? Watch closely for changes in your health, and be sure to contact your doctor if:  ? · You have new or worse vaginal discharge. ? · You feel sad or depressed. ? · You are having problems with your breasts or breastfeeding. Where can you learn more? Go to http://elsy-pacheco.info/. Enter U202 in the search box to learn more about \"Postpartum: Care Instructions. \"  Current as of: March 16, 2017  Content Version: 11.4  © 2959-4265 [employer]. Care instructions adapted under license by [employer] (which disclaims liability or warranty for this information). If you have questions about a medical condition or this instruction, always ask your healthcare professional. [place] [address] any warranty or liability for your use of this information.

## 2018-03-10 NOTE — DISCHARGE SUMMARY
Obstetrical Discharge Summary     Name: Mikel Slade MRN: [id number]  SSN: [id number]    YOB: 1980  Age: 44 y.o. Sex: female      Admit Date: 3/8/2018    Discharge Date: 3/10/2018     Admitting Physician: Radames San MD     Attending Physician:  Radames San MD     Admission Diagnoses: Labor & Delivery  PROM (premature rupture of membranes) @ term,     Procedure Performed:  * No surgery found *  Surgical  Pitocin Augmentation, Epidural  2nd degree Lac with repair       Discharge Diagnoses:   Information for the patient's :  Jaskaran Parekh [[id number]]   Delivery of a 7 lb 12.9 oz (3.54 kg) male infant via Vaginal, Spontaneous Delivery on 3/8/2018 at 11:11 PM  by . Apgars were 8 and 8.  by Georgina Graves CNM  Additional Diagnoses:   Hospital Problems  Date Reviewed: 3/8/2018          Codes Class Noted POA    PROM (premature rupture of membranes) ICD-10-CM: O42.90  ICD-9-CM: 658.10  3/8/2018 Unknown             Lab Results   Component Value Date/Time    Rubella, External immune 2017    GrBStrep, External negative 2018       Hospital Course: Normal hospital course following the delivery. Patient Disposition: Home      Followup Care:  Discharge Condition: good  Activity as tolerated  Regular Diet  Keep wound clean and dry    Patient Instructions:   Current Discharge Medication List      START taking these medications    Details   ibuprofen (MOTRIN) 800 mg tablet Take 1 Tab by mouth every eight (8) hours. Qty: 30 Tab, Refills: 1    Associated Diagnoses: Recent childbirth         CONTINUE these medications which have NOT CHANGED    Details   calcium carbonate (TUMS) 200 mg calcium (500 mg) chew Take 1 Tab by mouth daily. L. ACIDOPHILUS/L.BULGARICUS (FLORANEX PO) Take  by mouth. PNV No12-Iron-FA-DSS-OM-3 29 mg iron-1 mg -50 mg CPKD Take  by mouth. Reference my discharge instructions.     Follow-up Appointments   Procedures    FOLLOW UP VISIT Appointment in: 6 Weeks     Standing Status:   Standing     Number of Occurrences:   1     Order Specific Question:   Appointment in     Answer:   6 Weeks        Signed By:  yTe Orellana NP     March 10, 2018         SIRIA Brush/CEDRIC

## 2018-04-17 ENCOUNTER — OFFICE VISIT (OUTPATIENT)
Dept: OBGYN CLINIC | Age: 38
End: 2018-04-17

## 2018-04-17 VITALS
BODY MASS INDEX: 26.83 KG/M2 | DIASTOLIC BLOOD PRESSURE: 78 MMHG | WEIGHT: 166.25 LBS | SYSTOLIC BLOOD PRESSURE: 132 MMHG

## 2018-04-17 NOTE — PATIENT INSTRUCTIONS
Pelvic Exam: Care Instructions  Your Care Instructions    When your doctor examines all of your pelvic organs, it's called a pelvic exam. Two good reasons to have this kind of exam are to check for sexually transmitted infections (STIs) and to get a Pap test. A Pap test is also called a Pap smear. It checks for early changes that can lead to cancer of the cervix. Sometimes a pelvic exam is part of a regular checkup. In this case, you can do some things to make your test results as accurate as possible. · Try to schedule the exam when you don't have your period. · Don't use douches, tampons, or vaginal medicines, sprays, or powders for 24 hours before your exam.  · Don't have sex for 24 hours before your exam.  Other times, women have this kind of exam at any time of the month. This is because they have pelvic pain, bleeding, or discharge. Or they may have another pelvic problem. Before your exam, it's important to share some information with your doctor. For example, if you are a survivor of rape or sexual abuse, you can talk about any concerns you may have. Your doctor will also want to know if you are pregnant or use birth control. And he or she will want to hear about any problems, surgeries, or procedures you have had in your pelvic area. You will also need to tell your doctor when your last period was. Follow-up care is a key part of your treatment and safety. Be sure to make and go to all appointments, and call your doctor if you are having problems. It's also a good idea to know your test results and keep a list of the medicines you take. How is a pelvic exam done? · During a pelvic exam, you will:  ¨ Take off your clothes below the waist. You will get a paper or cloth cover to put over the lower half of your body. Silver Raddle on your back on an exam table. Your feet will be raised above you. Stirrups will support your feet. · The doctor will:  Christine Aranabert you to relax your knees.  Your knees need to lean out, toward the walls. ¨ Check the opening of your vagina for sores or swelling. ¨ Gently put a tool called a speculum into your vagina. It opens the vagina a little bit. You will feel some pressure. But if you are relaxed, it will not hurt. It lets your doctor see inside the vagina. ¨ Use a small brush, spatula, or swab to get a sample of cells, if you are having a Pap test or culture. The doctor then removes the speculum. ¨ Put on gloves and put one or two fingers of one hand into your vagina. The other hand goes on your lower belly. This lets your doctor feel your pelvic organs. You will probably feel some pressure. Try to stay relaxed. ¨ Put one gloved finger into your rectum and one into your vagina, if needed. This can also help check your pelvic organs. This exam takes about 10 minutes. At the end, you will get a washcloth or tissue to clean your vaginal area. It's normal to have some discharge after this exam. You can then get dressed. Some test results may be ready right away. But results from a culture or a Pap test may take several days or a few weeks. Why should you have a pelvic exam?  · You want to have recommended screening tests. This includes a Pap test.  · You think you have a vaginal infection. Signs include itching, burning, or unusual discharge. · You might have been exposed to a sexually transmitted infection (STI), such as chlamydia or herpes. · You have vaginal bleeding that is not part of your normal menstrual period. · You have pain in your belly or pelvis. · You have been sexually assaulted. A pelvic exam lets your doctor collect evidence and check for STIs. · You are pregnant. · You are having trouble getting pregnant. What are the risks of a pelvic exam?  There are no risks from a pelvic exam.  When should you call for help? Watch closely for changes in your health, and be sure to contact your doctor if you have any problems. Where can you learn more?   Go to http://elsy-pacheco.info/. Enter D429 in the search box to learn more about \"Pelvic Exam: Care Instructions. \"  Current as of: October 13, 2016  Content Version: 11.4  © 8063-8476 [employer], [employer]. Care instructions adapted under license by [employer] (which disclaims liability or warranty for this information). If you have questions about a medical condition or this instruction, always ask your healthcare professional. [place] [address] any warranty or liability for your use of this information.

## 2019-05-31 ENCOUNTER — OFFICE VISIT (OUTPATIENT)
Dept: OBGYN CLINIC | Age: 39
End: 2019-05-31

## 2019-05-31 VITALS
HEIGHT: 66 IN | BODY MASS INDEX: 22.24 KG/M2 | SYSTOLIC BLOOD PRESSURE: 114 MMHG | DIASTOLIC BLOOD PRESSURE: 74 MMHG | WEIGHT: 138.4 LBS

## 2019-05-31 DIAGNOSIS — Z01.419 WELL WOMAN EXAM: Primary | ICD-10-CM

## 2019-05-31 DIAGNOSIS — Z11.51 SCREENING FOR HUMAN PAPILLOMAVIRUS: ICD-10-CM

## 2019-05-31 NOTE — PATIENT INSTRUCTIONS

## 2019-05-31 NOTE — PROGRESS NOTES
Annual exam ages 20-44    [address] [place] [address] Deborah is a ,  43 y.o. female [facility] Patient's last menstrual period was 2019. .    She presents for her annual checkup. She is having HMB on the first day of her menses. Wearing a super tampon and pad and changing every hour. .    With regard to the Gardasil vaccine, she has not received it yet. Menstrual status:    Her periods are heavy in flow. She is using five to ten pads or tampons per day, usually regular and last 26-30 days. She denies dysmenorrhea. She reports no premenstrual symptoms. Contraception:    The current method of family planning is none. Sexual history:     She  reports that she currently engages in sexual activity and has had partners who are Male. She reports using the following method of birth control/protection: None. .    Medical conditions:    Since her last annual GYN exam about two years ago, she has not the following changes in her health history: none. Pap and Mammogram History:    Her most recent Pap smear was normal, obtained 2 year(s) ago. The patient has never had a mammogram.    Breast Cancer History/Substance Abuse: negative    Past Medical History:   Diagnosis Date    Essential hypertension      1 st pregnancy few high blood pressure, 24 hour urine normal - few days before delivery    H/O maternal fourth degree perineal laceration, currently pregnant 10/7/2012    History of PCOS     Infertility, female     clomid pregnancy    Polycystic disease, ovaries     previously on metformin    Postpartum depression     saw a counselor     Past Surgical History:   Procedure Laterality Date    HX GYN      fourth degree laceration       Current Outpatient Medications   Medication Sig Dispense Refill    calcium carbonate (TUMS) 200 mg calcium (500 mg) chew Take 1 Tab by mouth daily.  L. ACIDOPHILUS/L.BULGARICUS (FLORANEX PO) Take  by mouth.       PNV No12-Iron-FA-DSS-OM-3 29 mg iron-1 mg -50 mg CPKD Take  by mouth. Allergies: Patient has no known allergies. Tobacco History:  reports that she has never smoked. She has never used smokeless tobacco.  Alcohol Abuse:  reports that she drinks alcohol. Drug Abuse:  reports that she does not use drugs. Family Medical/Cancer History:   Family History   Problem Relation Age of Onset    Hypertension Father     Heart Disease Father     Diabetes Father         [place] dependendent (early onset)    Colon Cancer Maternal Grandmother [de-identified]    Cancer Maternal Grandmother         colon    Cancer Maternal Grandfather 58        Melanoma    Cancer Mother         endometrial cancer        Review of Systems - History obtained from the patient  Constitutional: negative for weight loss, fever, night sweats  HEENT: negative for hearing loss, earache, congestion, snoring, sorethroat  CV: negative for chest pain, palpitations, edema  Resp: negative for cough, shortness of breath, wheezing  GI: negative for change in bowel habits, abdominal pain, black or bloody stools  : negative for frequency, dysuria, hematuria, vaginal discharge  MSK: negative for back pain, joint pain, muscle pain  Breast: negative for breast lumps, nipple discharge, galactorrhea  Skin :negative for itching, rash, hives  Neuro: negative for dizziness, headache, confusion, weakness  Psych: negative for anxiety, depression, change in mood  Heme/lymph: negative for bleeding, bruising, pallor    Physical Exam    Visit Vitals  /74 (BP 1 Location: Left arm, BP Patient Position: Sitting)   Ht 5' 6\" (1.676 m)   Wt 138 lb 6.4 oz (62.8 kg)   LMP 05/23/2019   Breastfeeding?  No   BMI 22.34 kg/m²       Constitutional  · Appearance: well-nourished, well developed, alert, in no acute distress    HENT  · Head and Face: appears normal    Neck  · Inspection/Palpation: normal appearance, no masses or tenderness  · Lymph Nodes: no lymphadenopathy present  · Thyroid: gland size normal, nontender, no nodules or masses present on palpation    Chest  · Respiratory Effort: breathing unlabored  · Auscultation: normal breath sounds    Cardiovascular  · Heart:  · Auscultation: regular rate and rhythm without murmur    Breasts  · Inspection of Breasts: breasts symmetrical, no skin changes, no discharge present, nipple appearance normal, no skin retraction present  · Palpation of Breasts and Axillae: no masses present on palpation, no breast tenderness  · Axillary Lymph Nodes: no lymphadenopathy present    Gastrointestinal  · Abdominal Examination: abdomen non-tender to palpation, normal bowel sounds, no masses present  · Liver and spleen: no hepatomegaly present, spleen not palpable  · Hernias: no hernias identified    Genitourinary  · External Genitalia: normal appearance for age, no discharge present, no tenderness present, no inflammatory lesions present, no masses present, no atrophy present  · Vagina: normal vaginal vault without central or paravaginal defects, no discharge present, no inflammatory lesions present, no masses present  · Bladder: non-tender to palpation  · Urethra: appears normal  · Cervix: normal   · Uterus: normal size, shape and consistency  · Adnexa: no adnexal tenderness present, no adnexal masses present  · Perineum: perineum within normal limits, no evidence of trauma, no rashes or skin lesions present  · Anus: anus within normal limits, no hemorrhoids present  · Inguinal Lymph Nodes: no lymphadenopathy present    Skin  · General Inspection: no rash, no lesions identified    Neurologic/Psychiatric  · Mental Status:  · Orientation: grossly oriented to person, place and time  · Mood and Affect: mood normal, affect appropriate    .   Assessment:  Routine gynecologic examination  Heavy menses on day one that keeps her close to a bathroom, disrupting normal daily activity    Plan:  stefani for 3 months to message in when last pack for either rx for a comparable option  Seeing a pcp soon, has some concerns with depressed mood.   TO keep me updated

## 2019-06-03 LAB
CYTOLOGIST CVX/VAG CYTO: NORMAL
CYTOLOGY CVX/VAG DOC CYTO: NORMAL
CYTOLOGY CVX/VAG DOC THIN PREP: NORMAL
DX ICD CODE: NORMAL
HPV I/H RISK 1 DNA CVX QL PROBE+SIG AMP: NEGATIVE
Lab: NORMAL
OTHER STN SPEC: NORMAL
STAT OF ADQ CVX/VAG CYTO-IMP: NORMAL

## 2019-07-18 ENCOUNTER — OFFICE VISIT (OUTPATIENT)
Dept: FAMILY MEDICINE CLINIC | Age: 39
End: 2019-07-18

## 2019-07-18 VITALS
BODY MASS INDEX: 21.69 KG/M2 | SYSTOLIC BLOOD PRESSURE: 122 MMHG | DIASTOLIC BLOOD PRESSURE: 86 MMHG | HEIGHT: 66 IN | OXYGEN SATURATION: 98 % | WEIGHT: 135 LBS | RESPIRATION RATE: 18 BRPM | HEART RATE: 82 BPM | TEMPERATURE: 98.2 F

## 2019-07-18 DIAGNOSIS — F32.1 CURRENT MODERATE EPISODE OF MAJOR DEPRESSIVE DISORDER WITHOUT PRIOR EPISODE (HCC): ICD-10-CM

## 2019-07-18 DIAGNOSIS — Z00.00 ENCOUNTER FOR PREVENTIVE CARE: Primary | ICD-10-CM

## 2019-07-18 DIAGNOSIS — R63.4 WEIGHT LOSS, UNINTENTIONAL: ICD-10-CM

## 2019-07-18 DIAGNOSIS — N92.0 MENORRHAGIA WITH REGULAR CYCLE: ICD-10-CM

## 2019-07-18 DIAGNOSIS — Z83.71 FAMILY HISTORY OF COLONIC POLYPS: ICD-10-CM

## 2019-07-18 PROBLEM — Z34.81 PRENATAL CARE, SUBSEQUENT PREGNANCY IN FIRST TRIMESTER: Status: RESOLVED | Noted: 2017-07-26 | Resolved: 2019-07-18

## 2019-07-18 PROBLEM — O42.90 PROM (PREMATURE RUPTURE OF MEMBRANES): Status: RESOLVED | Noted: 2018-03-08 | Resolved: 2019-07-18

## 2019-07-18 RX ORDER — SERTRALINE HYDROCHLORIDE 25 MG/1
25 TABLET, FILM COATED ORAL DAILY
Qty: 30 TAB | Refills: 1 | Status: SHIPPED | OUTPATIENT
Start: 2019-07-18 | End: 2019-08-02 | Stop reason: SDUPTHER

## 2019-07-18 NOTE — PATIENT INSTRUCTIONS
Well Visit, Ages 25 to 48: Care Instructions  Your Care Instructions    Physical exams can help you stay healthy. Your doctor has checked your overall health and may have suggested ways to take good care of yourself. He or she also may have recommended tests. At home, you can help prevent illness with healthy eating, regular exercise, and other steps. Follow-up care is a key part of your treatment and safety. Be sure to make and go to all appointments, and call your doctor if you are having problems. It's also a good idea to know your test results and keep a list of the medicines you take. How can you care for yourself at home? · Reach and stay at a healthy weight. This will lower your risk for many problems, such as obesity, diabetes, heart disease, and high blood pressure. · Get at least 30 minutes of physical activity on most days of the week. Walking is a good choice. You also may want to do other activities, such as running, swimming, cycling, or playing tennis or team sports. Discuss any changes in your exercise program with your doctor. · Do not smoke or allow others to smoke around you. If you need help quitting, talk to your doctor about stop-smoking programs and medicines. These can increase your chances of quitting for good. · Talk to your doctor about whether you have any risk factors for sexually transmitted infections (STIs). Having one sex partner (who does not have STIs and does not have sex with anyone else) is a good way to avoid these infections. · Use birth control if you do not want to have children at this time. Talk with your doctor about the choices available and what might be best for you. · Protect your skin from too much sun. When you're outdoors from 10 a.m. to 4 p.m., stay in the shade or cover up with clothing and a hat with a wide brim. Wear sunglasses that block UV rays. Even when it's cloudy, put broad-spectrum sunscreen (SPF 30 or higher) on any exposed skin.   · See a dentist one or two times a year for checkups and to have your teeth cleaned. · Wear a seat belt in the car. · Drink alcohol in moderation, if at all. That means no more than 2 drinks a day for men and 1 drink a day for women. Follow your doctor's advice about when to have certain tests. These tests can spot problems early. For everyone  · Cholesterol. Have the fat (cholesterol) in your blood tested after age 21. Your doctor will tell you how often to have this done based on your age, family history, or other things that can increase your risk for heart disease. · Blood pressure. Have your blood pressure checked during a routine doctor visit. Your doctor will tell you how often to check your blood pressure based on your age, your blood pressure results, and other factors. · Vision. Talk with your doctor about how often to have a glaucoma test.  · Diabetes. Ask your doctor whether you should have tests for diabetes. · Colon cancer. Have a test for colon cancer at age 48. You may have one of several tests. If you are younger than 48, you may need a test earlier if you have any risk factors. Risk factors include whether you already had a precancerous polyp removed from your colon or whether your parent, brother, sister, or child has had colon cancer. For women  · Breast exam and mammogram. Talk to your doctor about when you should have a clinical breast exam and a mammogram. Medical experts differ on whether and how often women under 50 should have these tests. Your doctor can help you decide what is right for you. · Pap test and pelvic exam. Begin Pap tests at age 24. A Pap test is the best way to find cervical cancer. The test often is part of a pelvic exam. Ask how often to have this test.  · Tests for sexually transmitted infections (STIs). Ask whether you should have tests for STIs. You may be at risk if you have sex with more than one person, especially if your partners do not wear condoms.   For men  · Tests for sexually transmitted infections (STIs). Ask whether you should have tests for STIs. You may be at risk if you have sex with more than one person, especially if you do not wear a condom. · Testicular cancer exam. Ask your doctor whether you should check your testicles regularly. · Prostate exam. Talk to your doctor about whether you should have a blood test (called a PSA test) for prostate cancer. Experts differ on whether and when men should have this test. Some experts suggest it if you are older than 39 and are -American or have a father or brother who got prostate cancer when he was younger than 72. When should you call for help? Watch closely for changes in your health, and be sure to contact your doctor if you have any problems or symptoms that concern you. Where can you learn more? Go to http://elsy-pacheco.info/. Enter P072 in the search box to learn more about \"Well Visit, Ages 25 to 48: Care Instructions. \"  Current as of: March 28, 2018  Content Version: 11.9  © 8125-5916 [employer]. Care instructions adapted under license by [employer] (which disclaims liability or warranty for this information). If you have questions about a medical condition or this instruction, always ask your healthcare professional. [address] any warranty or liability for your use of this information. Learning About Mindfulness for Stress  What are mindfulness and stress? Stress is what you feel when you have to handle more than you are used to. A lot of things can cause stress. You may feel stress when you go on a job interview, take a test, or run a race. This kind of short-term stress is normal and even useful. It can help you if you need to work hard or react quickly. Stress also can last a long time. Long-term stress is caused by stressful situations or events.  Examples of long-term stress include long-term health problems, ongoing problems at work, and conflicts in your family. Long-term stress can harm your health. Mindfulness is a focus only on things happening in the present moment. It's a process of purposefully paying attention to and being aware of your surroundings, your emotions, your thoughts, and how your body feels. You are aware of these things, but you aren't judging these experiences as \"good\" or \"bad. \" Mindfulness can help you learn to calm your mind and body to help you cope with illness, pain, and stress. How does mindfulness help to relieve stress? Mindfulness can help quiet your mind and relax your body. Studies show that it can help some people sleep better, feel less anxious, and bring their blood pressure down. And it's been shown to help some people live and cope better with certain health problems like heart disease, depression, chronic pain, and cancer. How do you practice mindfulness? To be mindful is to pay attention, to be present, and to be accepting. · When you're mindful, you do just one thing and you pay close attention to that one thing. For example, you may sit quietly and notice your emotions or how your food tastes and smells. · When you're present, you focus on the things that are happening right now. You let go of your thoughts about the past and the future. When you dwell on the past or the future, you miss moments that can heal and strengthen you. You may miss moments like hearing a child laugh or seeing a friendly face when you think you're all alone. · When you're accepting, you don't  the present moment. Instead you accept your thoughts and feelings as they come. You can practice anytime, anywhere, and in any way you choose. You can practice in many ways. Here are a few ideas:  · While doing your chores, like washing the dishes, let your mind focus on what's in your hand. What does the dish feel like? Is the water warm or cold?   · Go outside and take a few deep breaths. What is the air like? Is it warm or cold? · When you can, take some time at the start of your day to sit alone and think. · Take a slow walk by yourself. Count your steps while you breathe in and out. · Try yoga breathing exercises, stretches, and poses to strengthen and relax your muscles. · At work, if you can, try to stop for a few moments each hour. Note how your body feels. Let yourself regroup and let your mind settle before you return to what you were doing. · If you struggle with anxiety or \"worry thoughts,\" imagine your mind as a blue cathy and your worry thoughts as clouds. Now imagine those worry thoughts floating across your mind's cathy. Just let them pass by as you watch. Follow-up care is a key part of your treatment and safety. Be sure to make and go to all appointments, and call your doctor if you are having problems. It's also a good idea to know your test results and keep a list of the medicines you take. Where can you learn more? Go to http://elsy-pacheco.info/. Enter U502 in the search box to learn more about \"Learning About Mindfulness for Stress. \"  Current as of: June 28, 2018  Content Version: 11.9  © 8067-9137 [employer]. Care instructions adapted under license by [employer] (which disclaims liability or warranty for this information). If you have questions about a medical condition or this instruction, always ask your healthcare professional. [place] [address] any warranty or liability for your use of this information. [facility], [place] [address] [facility] [address], [address]   [facility]   [address]  Phone [phone]  Fax [phone]  [facility]. [employer]    Maria Red 541 counselor, substance abuse counselor  Lisa.[facility], [facility] [address]  Phone: [phone]    The [facility] counseling  [address]; [address] [facility], 1517 [facility]  [phone]  Rondal Adjutant. [address]  [address]. [place], [address]  [phone]    LETA GarciaW  Clinical Counseling and Consulting of [address] [facility] all ages, problems  [address] [facility], [address]  [facility], 1678 [facility]  Phone: [phone]  Fax: [phone]  Specializes in chronic pain patients  Luci Randhawabrew  [address] [facility]   [address] [facility] 83,8Th [address]  [address]  Phone:  [phone]     Valentina Burk at [facility] [address], [address], [address]  Phone: [phone]    [facility]  Brandy Swan and [facility]   (adolescents, adults, families for all problems)  1000 [facility],5Th Floor, 29 [facility]  [employer]  Phone: [phone]    [address] Office  Phone: [phone]  [facility] [place]   45 [facility], [address] [place] [address]   Call Bertha Blakc for [address] Office  Phone: [phone]  FAX: [phone]  [address]  Phone: [phone]  FAX: [phone]  201 [facility], [address]  [facility] 7 [facility]  [phone]  FAX: [phone] [address] [place] [address], [facility] [address]    [facility] Office  [phone]  FAX: 800 [facility], 295 [facility], [address] [place], [place] [address]way [address] 40 [facility] 33  [phone]    [facility] [employer] [facility] Kathy Cavazos, Ph.D  [address] [facility] [address] [facility], 103 [facility]   [phone]    The [address] [facility] Office  1099 [facility], [address]  [phone]    [facility] Office  [address], [address], [address] [place] [address], [facility], [address]Maria C Rafael   Phone:([phone]) [address] Shantelle Leavitt near [address] [place] [address]  [phone]    [address]  35 [facility], [address] [employer]  [address] [place] [address], [address] [place] [address] Deborah Leavitt, [address]  [phone]    Levonorgestrel (Into the uterus)   Levonorgestrel (sta-jpc-xdc-ARASH-trel)  Prevents pregnancy and treats heavy menstrual bleeding. This is an intrauterine device (IUD), which is a reversible form of birth control. This IUD slowly releases levonorgestrel, a hormone. Brand Name(s): Michaela Rosado, [place]   There may be other brand names for this medicine. When This Medicine Should Not Be Used: This device is not right for everyone. Do not use it if you had an allergic reaction to levonorgestrel, or if you are pregnant. How to Use This Medicine:   Device  · A nurse or other trained health professional will give you this medicine. · The IUD is usually inserted by your doctor during your monthly period. You will need to see your doctor 4 to 6 weeks after the IUD is placed and then once a year. · Your IUD has a string or \"tail\" that is made of plastic thread. About one or two inches of this string hangs into your vagina. You cannot see this string, and it will not cause problems when you have sex. Check your IUD after each monthly period. You may not be protected against pregnancy if you cannot feel the string or if you feel plastic. Do the following to check the placement of your IUD:  [facility] AUTHORITY your hands with soap and warm water. Dry them with a clean towel. ¨ Bend your knees and squat low to the ground. ¨ Gently put your index finger high inside your vagina. The cervix is at the top of the vagina. Find the IUD string coming from your cervix. Never pull on the string.  You should not be able to feel the plastic of the IUD itself. Wash your hands after you are done checking your IUD string. · Your doctor will need to remove your IUD after 3 years for [place], after 4 years for WIEDEN, or after 5 years for [facility] or [place] [address]. You will also need to have it replaced if it comes out of your uterus. If you are using Mirena® or Liletta® and want to stop, your doctor can remove it at any time. However, you may become pregnant as soon as Mirena® is removed or if you have intercourse the week before WIEDEN is removed. Use another form of birth control or have a new IUD inserted to keep from getting pregnant. Drugs and Foods to Avoid:   Ask your doctor or pharmacist before using any other medicine, including over-the-counter medicines, vitamins, and herbal products. · Some medicines can affect how this device works. Tell your doctor if you are using a blood thinner (including warfarin). Warnings While Using This Medicine:   · Tell your doctor if you are breastfeeding, or if you had a baby, miscarriage, or  in the past 3 months. Tell your doctor if you have liver disease (including tumor or cancer), heart disease, breast cancer, heart or blood circulation problems, migraine, high blood pressure, or a history of heart valve problems, blood clotting problems, stroke, or heart attack. Tell your doctor if you have problems with your immune system or have had surgery on your female organs (especially fallopian tubes). · Tell your doctor if you have had any problems, infections, or other conditions that affected your reproductive system. There are many problems that could make an IUD a bad choice for you, including if you have fibroids, unexplained bleeding, a uterus that has an unusual shape, a recent infection, a history of pelvic inflammatory disease, an abnormal Pap test, ectopic pregnancy, cancer or suspected cancer, or an existing IUD.   · There is a small chance that you could get pregnant when using an IUD, just as there is with any birth control. If you get pregnant, your doctor may remove your IUD to lower the risk of miscarriage or other problems. · This medicine may cause the following problems:  ¨ Increased risk of ectopic pregnancy (pregnancy outside the uterus)  ¨ Increased risk of serious infections, including sepsis  ¨ Increased risk of pelvic inflammatory disease (PID) or endometritis  ¨ Perforation (hole in the wall of your uterus), which can damage other organs  ¨ Increased risk for ovarian cysts  ¨ Increased risk of breast cancer  ¨ Increased risk of high blood pressure, stroke, heart attack, or clotting problems  · You might have some spotting and cramping during the first weeks after the IUD has been inserted. These symptoms should decrease or go away within a few weeks up to 6 months. · You could have less bleeding or even stop having periods by the end of the first year. Call your doctor if you have a change from your regular bleeding pattern after you have had your IUD for awhile, such as more bleeding or if you miss a period (and you were having periods even with your IUD). · An IUD can slip partly or all the way out of your uterus. If this happens, use condoms or another form of birth control, and call your doctor right away. · This IUD will not protect you from HIV/AIDS or other sexually transmitted diseases. · If you have the [address] or Carleen Magana, tell your healthcare provider before you have an MRI test.  · Your doctor will check your progress and the effects of this medicine at regular visits. Keep all appointments.   Possible Side Effects While Using This Medicine:   Call your doctor right away if you notice any of these side effects:  · Allergic reaction: Itching or hives, swelling in your face or hands, swelling or tingling in your mouth or throat, chest tightness, trouble breathing  · Chest pain, problems with speech or walking, numbness or weakness in your arm or leg or on one side of your body  · Heavy bleeding from your vagina  · Pain during sex, or if your partner feels the hard plastic of the IUD during sex  · Severe headache, vision changes  · Stomach or pelvic pain, tenderness, or cramping that is sudden or severe  · Unusual bleeding, bruising, or weakness  · Vaginal discharge that has a bad smell, fever, chills, sores on your genitals  · Yellow skin or eyes  If you notice these less serious side effects, talk with your doctor:   · Acne or other skin changes  · Breast pain  · Change in bleeding pattern after the first few months  · Dizziness or lightheadedness after IUD is placed  · Mild itching around your vagina and genitals  If you notice other side effects that you think are caused by this medicine, tell your doctor. Call your doctor for medical advice about side effects. You may report side effects to FDA at 7-207-FDA-7162  © 2017 [address] Information is for End User's use only and may not be sold, redistributed or otherwise used for commercial purposes. The above information is an  only. It is not intended as medical advice for individual conditions or treatments. Talk to your doctor, nurse or pharmacist before following any medical regimen to see if it is safe and effective for you.

## 2019-07-18 NOTE — PROGRESS NOTES
Chief Complaint   Patient presents with    New Patient     establish care     Anxiety    Depression     1. Have you been to the ER, urgent care clinic since your last visit? Hospitalized since your last visit? No    2. Have you seen or consulted any other health care providers outside of the [address] [place] [address] since your last visit? Include any pap smears or colon screening.  No   3 most recent PHQ Screens 7/18/2019   Little interest or pleasure in doing things Several days   Feeling down, depressed, irritable, or hopeless Nearly every day   Total Score PHQ 2 4

## 2019-07-18 NOTE — PROGRESS NOTES
Farooq Rubio [facility]  [place]. [address], [address]  [phone]             Date of visit: 7/18/2019   Subjective:      History obtained from:  the patient. Brook Still is a 43 y.o. female who presents today for new patient, preventive care and some concerns  Losing weight she thinks from stress, down 13lb in 6 mo  Mom dx endometrial cancer last fall and just finished chemo so that is a stressor  Feeling depressed for 6 years (started with first delivery--felt isolated, not enough support, 4th deg lac)  Now with 3 kids feeling overwhelmed and not enjoying time with them, feeling angry  Was given buspar in 2017, didn't take it long but doesn't remember it being helpful    Saw gyn  Was having very heavy periods, was started on ocp's  teytula gave her more anxiety and insomnia so stopped it  Years ago was on ortho tri cyclen  Lasts 4 days, first day heavy, changes pad and tampon every 30 min and can't leave her house    Doesn't exercise, doesn't have time but chasing her 13 month old of course and does her housekeeping  Has good support from   Mom a support person but since mom sick pt more supportive of her  Has some friends but not many she talks to that much but has people she could call if she needed to    Has always had some anxiety  Saw a therapist after molar pregnancy but didn't click with them, wasn't helpful    Feels like self-esteem at all time low, hates the person she has become    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  Used to be Terri soler told her pt should have colonoscopy    Patient Active Problem List    Diagnosis Date Noted    Menorrhagia with regular cycle 07/18/2019    Current moderate episode of major depressive disorder without prior episode ([facility] [address] 07/18/2019    Family history of colonic polyps 07/18/2019    Insomnia 04/05/2017    Anxiety 04/05/2017    History of PCOS 07/10/2012     Current Outpatient Medications   Medication Sig Dispense Refill    sertraline (ZOLOFT) 25 mg tablet Take 1 Tab by mouth daily. 30 Tab 1    PNV No12-Iron-FA-DSS-OM-3 29 mg iron-1 mg -50 mg CPKD Take  by mouth.  calcium carbonate (TUMS) 200 mg calcium (500 mg) chew Take 1 Tab by mouth daily.  L. ACIDOPHILUS/L.BULGARICUS (FLORANEX PO) Take  by mouth. No Known Allergies  Past Medical History:   Diagnosis Date    Current moderate episode of major depressive disorder without prior episode ([facility] [address] 7/18/2019    Essential hypertension      1 st pregnancy few high blood pressure, 24 hour urine normal - few days before delivery    H/O maternal fourth degree perineal laceration, currently pregnant 10/7/2012    History of PCOS     Infertility, female     clomid pregnancy    Polycystic disease, ovaries     previously on metformin    Postpartum depression     saw a counselor     Past Surgical History:   Procedure Laterality Date    HX GYN      fourth degree laceration     Family History   Problem Relation Age of Onset    Hypertension Father     Heart Disease Father     Diabetes Father         Insluin dependendent (early onset)    Colon Cancer Maternal Grandmother [de-identified]    Cancer Maternal Grandfather 58        Melanoma    Cancer Mother         endometrial    Colon Polyps Mother     Colon Polyps Brother      Social History     Tobacco Use    Smoking status: Never Smoker    Smokeless tobacco: Never Used   Substance Use Topics    Alcohol use:  Yes     Alcohol/week: 1.0 standard drinks     Types: 1 Glasses of wine per week     Comment: socially       Social History     Social History Narrative     with 3 kids    Stay at home mom, had been hospital  previously        Review of Systems  Card: denies chest pain  Neuro: denies frequent headaches  All: denies allergies  ENT denies sinus   denies urinary pain  Pulm: denies shortness of breath      Objective:     Vitals:    07/18/19 1416   BP: 122/86   Pulse: 82   Resp: 18   Temp: 98.2 °F (36.8 °C)   TempSrc: Oral   SpO2: 98%   Weight: 135 lb (61.2 kg)   Height: 5' 6\" (1.676 m)     Body mass index is 21.79 kg/m². General: stated age, well-developed, and in NAD  Eyes: PERRL, EOMI, no redness or drainage  Nose: no drainage  Mouth: no lesions  Throat: no erythema, exudate or swelling  Neck: supple, symmetrical, trachea midline, no adenopathy and thyroid: not enlarged, symmetric, no tenderness/mass/nodules  Lungs:  clear to auscultation w/o rales, rhonchi, wheezes w/normal effort and no use of accessory muscles of respiration   Heart: regular rate and rhythm, S1, S2 normal, no murmur, click, rub or gallop  Abdomen: soft, nontender, no masses  Ext:  No edema noted. Lymph: no cervical adenopathy appreciated  Skin:  Normal. and no rash or abnormalities   Neuro: normal gait, CN 2-12 intact  Psych: alert and oriented to person, place, time and situation and Speech: appropriate quality, quantity and organization of sentences and normal affect    Assessment/Plan:       ICD-10-CM ICD-9-CM    1. Encounter for preventive care Z00.00 V70.0 CBC WITH AUTOMATED DIFF      METABOLIC PANEL, COMPREHENSIVE      LIPID PANEL      TSH 3RD GENERATION   2. Weight loss, unintentional R63.4 783.21    3. Menorrhagia with regular cycle N92.0 626.2 CBC WITH AUTOMATED DIFF   4. Current moderate episode of major depressive disorder without prior episode (HCC) F32.1 296.22 TSH 3RD GENERATION   5.  Family history of colonic polyps Z83.71 V18.51         Orders Placed This Encounter    CBC WITH AUTOMATED DIFF    METABOLIC PANEL, COMPREHENSIVE    LIPID PANEL    TSH 3RD GENERATION    sertraline (ZOLOFT) 25 mg tablet       Health Maintenance   Topic Date Due    Influenza Age 5 to Adult  08/01/2019    PAP AKA CERVICAL CYTOLOGY  05/31/2022    DTaP/Tdap/Td series (3 - Td) 12/20/2027    Pneumococcal 0-64 years  Aged Out     Preventive up to date  Routine labs as above  The weight loss, loss of appetite, sleep disturbance all likely due to depression  Has had it for 6 years, ready for treatment  Recommended med, therapy, and exercise  She will try all 3  Encouraged her to keep me posted on mychart. Does need something for menorrhagia but not wanting to start new hormonal pills when starting zoloft  The last ones made her mood worse  Discussed IUD as good option and handout given    Does need colonoscopy as brother had polyps age 39, mom and others also with polyps  Will ask again next time if wants referral, now not a good time but she does plan to do it before too long    Discussed the diagnosis and plan and she expressed understanding. Follow-up and Dispositions    · Return in about 1 month (around 8/15/2019) for Follow up.          Sarah Olivarez MD

## 2019-07-19 LAB
ALBUMIN SERPL-MCNC: 4.8 G/DL (ref 3.5–5.5)
ALBUMIN/GLOB SERPL: 1.9 {RATIO} (ref 1.2–2.2)
ALP SERPL-CCNC: 60 IU/L (ref 39–117)
ALT SERPL-CCNC: 23 IU/L (ref 0–32)
AST SERPL-CCNC: 17 IU/L (ref 0–40)
BASOPHILS # BLD AUTO: 0 X10E3/UL (ref 0–0.2)
BASOPHILS NFR BLD AUTO: 0 %
BILIRUB SERPL-MCNC: 0.3 MG/DL (ref 0–1.2)
BUN SERPL-MCNC: 17 MG/DL (ref 6–20)
BUN/CREAT SERPL: 26 (ref 9–23)
CALCIUM SERPL-MCNC: 9.7 MG/DL (ref 8.7–10.2)
CHLORIDE SERPL-SCNC: 103 MMOL/L (ref 96–106)
CHOLEST SERPL-MCNC: 232 MG/DL (ref 100–199)
CO2 SERPL-SCNC: 25 MMOL/L (ref 20–29)
CREAT SERPL-MCNC: 0.66 MG/DL (ref 0.57–1)
EOSINOPHIL # BLD AUTO: 0.1 X10E3/UL (ref 0–0.4)
EOSINOPHIL NFR BLD AUTO: 1 %
ERYTHROCYTE [DISTWIDTH] IN BLOOD BY AUTOMATED COUNT: 14.5 % (ref 12.3–15.4)
GLOBULIN SER CALC-MCNC: 2.5 G/DL (ref 1.5–4.5)
GLUCOSE SERPL-MCNC: 85 MG/DL (ref 65–99)
HCT VFR BLD AUTO: 41.1 % (ref 34–46.6)
HDLC SERPL-MCNC: 59 MG/DL
HGB BLD-MCNC: 13.8 G/DL (ref 11.1–15.9)
IMM GRANULOCYTES # BLD AUTO: 0 X10E3/UL (ref 0–0.1)
IMM GRANULOCYTES NFR BLD AUTO: 0 %
INTERPRETATION, 910389: NORMAL
LDLC SERPL CALC-MCNC: 138 MG/DL (ref 0–99)
LYMPHOCYTES # BLD AUTO: 2 X10E3/UL (ref 0.7–3.1)
LYMPHOCYTES NFR BLD AUTO: 34 %
MCH RBC QN AUTO: 27.3 PG (ref 26.6–33)
MCHC RBC AUTO-ENTMCNC: 33.6 G/DL (ref 31.5–35.7)
MCV RBC AUTO: 81 FL (ref 79–97)
MONOCYTES # BLD AUTO: 0.3 X10E3/UL (ref 0.1–0.9)
MONOCYTES NFR BLD AUTO: 6 %
NEUTROPHILS # BLD AUTO: 3.6 X10E3/UL (ref 1.4–7)
NEUTROPHILS NFR BLD AUTO: 59 %
PLATELET # BLD AUTO: 214 X10E3/UL (ref 150–450)
POTASSIUM SERPL-SCNC: 3.9 MMOL/L (ref 3.5–5.2)
PROT SERPL-MCNC: 7.3 G/DL (ref 6–8.5)
RBC # BLD AUTO: 5.05 X10E6/UL (ref 3.77–5.28)
SODIUM SERPL-SCNC: 142 MMOL/L (ref 134–144)
TRIGL SERPL-MCNC: 177 MG/DL (ref 0–149)
TSH SERPL DL<=0.005 MIU/L-ACNC: 1.02 UIU/ML (ref 0.45–4.5)
VLDLC SERPL CALC-MCNC: 35 MG/DL (ref 5–40)
WBC # BLD AUTO: 6 X10E3/UL (ref 3.4–10.8)

## 2019-07-31 ENCOUNTER — PATIENT MESSAGE (OUTPATIENT)
Dept: FAMILY MEDICINE CLINIC | Age: 39
End: 2019-07-31

## 2019-08-02 RX ORDER — SERTRALINE HYDROCHLORIDE 25 MG/1
50 TABLET, FILM COATED ORAL DAILY
Qty: 30 TAB | Refills: 1 | COMMUNITY
Start: 2019-08-02 | End: 2019-08-06 | Stop reason: DRUGHIGH

## 2019-08-05 RX ORDER — SERTRALINE HYDROCHLORIDE 25 MG/1
50 TABLET, FILM COATED ORAL DAILY
Qty: 30 TAB | Refills: 1 | Status: CANCELLED | OUTPATIENT
Start: 2019-08-05

## 2019-08-05 NOTE — TELEPHONE ENCOUNTER
Pt is calling to request a refill for the following Rx: . Sil Maya Requested Prescriptions     Pending Prescriptions Disp Refills    sertraline (ZOLOFT) 25 mg tablet 30 Tab 1     Sig: Take 2 Tabs by mouth daily. Pt stated that the doctor change to dose to 50mg instead of 25mg.     [facility]pharmacy #[id number]  [phone]

## 2019-08-06 RX ORDER — SERTRALINE HYDROCHLORIDE 50 MG/1
50 TABLET, FILM COATED ORAL DAILY
Qty: 30 TAB | Refills: 1 | Status: SHIPPED | OUTPATIENT
Start: 2019-08-06 | End: 2019-08-30 | Stop reason: SDUPTHER

## 2019-08-06 NOTE — TELEPHONE ENCOUNTER
PCP: Alisa Montoya MD    Last appt: 7/18/2019  Future Appointments   Date Time Provider Todd Ortega   8/30/2019  8:15 AM Racheal Carey MD [facility] UMESH FRANKEL       Requested Prescriptions     Pending Prescriptions Disp Refills    sertraline (ZOLOFT) 25 mg tablet 30 Tab 1     Sig: Take 2 Tabs by mouth daily.

## 2019-08-30 ENCOUNTER — OFFICE VISIT (OUTPATIENT)
Dept: FAMILY MEDICINE CLINIC | Age: 39
End: 2019-08-30

## 2019-08-30 VITALS
HEIGHT: 66 IN | WEIGHT: 137 LBS | DIASTOLIC BLOOD PRESSURE: 62 MMHG | RESPIRATION RATE: 16 BRPM | SYSTOLIC BLOOD PRESSURE: 102 MMHG | BODY MASS INDEX: 22.02 KG/M2 | HEART RATE: 83 BPM | OXYGEN SATURATION: 97 % | TEMPERATURE: 98.2 F

## 2019-08-30 DIAGNOSIS — G47.00 INSOMNIA, UNSPECIFIED TYPE: ICD-10-CM

## 2019-08-30 DIAGNOSIS — R63.4 WEIGHT LOSS, UNINTENTIONAL: Primary | ICD-10-CM

## 2019-08-30 DIAGNOSIS — F41.9 ANXIETY: ICD-10-CM

## 2019-08-30 DIAGNOSIS — F32.1 CURRENT MODERATE EPISODE OF MAJOR DEPRESSIVE DISORDER WITHOUT PRIOR EPISODE (HCC): ICD-10-CM

## 2019-08-30 DIAGNOSIS — Z23 ENCOUNTER FOR IMMUNIZATION: ICD-10-CM

## 2019-08-30 RX ORDER — SERTRALINE HYDROCHLORIDE 50 MG/1
50 TABLET, FILM COATED ORAL DAILY
Qty: 90 TAB | Refills: 1 | Status: SHIPPED | OUTPATIENT
Start: 2019-08-30 | End: 2020-02-28 | Stop reason: SDUPTHER

## 2019-08-30 NOTE — PROGRESS NOTES
Farooq Rubio [facility]  [place]. [place], [address]  [phone]             Date of visit: 8/30/2019   Subjective:      History obtained from:  the patient. Leo Wright is a 43 y.o. female who presents today for f/u chronic problems    Dec appetite improved, got worse at first, then better. Gained 2 pounds. Feeling better, more like herself  Not panicky  Better able to concentrate, like when reading a book, for example    Her sleep was not very good when depressed  The med sort of made her have distrupted sleep like waking up more and    No other side effects  Walking some and planning to walk more this school year, youngest brown be in     Had a vacation recently where 3year old did not sleep well, however was still able to enjoy the vacation and got some restful naps          Patient Active Problem List    Diagnosis Date Noted    Menorrhagia with regular cycle 07/18/2019    Current moderate episode of major depressive disorder without prior episode ([facility] [address] 07/18/2019    Family history of colonic polyps 07/18/2019    Insomnia 04/05/2017    Anxiety 04/05/2017    History of PCOS 07/10/2012     Current Outpatient Medications   Medication Sig Dispense Refill    sertraline (ZOLOFT) 50 mg tablet Take 1 Tab by mouth daily. Increased dose 90 Tab 1    calcium carbonate (TUMS) 200 mg calcium (500 mg) chew Take 1 Tab by mouth daily.  L. ACIDOPHILUS/L.BULGARICUS (FLORANEX PO) Take  by mouth.  PNV No12-Iron-FA-DSS-OM-3 29 mg iron-1 mg -50 mg CPKD Take  by mouth.        No Known Allergies  Past Medical History:   Diagnosis Date    Current moderate episode of major depressive disorder without prior episode ([facility] [address] 7/18/2019    Essential hypertension      1 st pregnancy few high blood pressure, 24 hour urine normal - few days before delivery    H/O maternal fourth degree perineal laceration, currently pregnant 10/7/2012    History of PCOS     Infertility, female     clomid pregnancy    Polycystic disease, ovaries     previously on metformin    Postpartum depression     saw a counselor     Past Surgical History:   Procedure Laterality Date    HX GYN      fourth degree laceration     Family History   Problem Relation Age of Onset    Hypertension Father     Heart Disease Father     Diabetes Father         Insluin dependendent (early onset)    Colon Cancer Maternal Grandmother [de-identified]    Cancer Maternal Grandfather 58        Melanoma    Cancer Mother         endometrial    Colon Polyps Mother     Colon Polyps Brother      Social History     Tobacco Use    Smoking status: Never Smoker    Smokeless tobacco: Never Used   Substance Use Topics    Alcohol use: Yes     Alcohol/week: 1.0 standard drinks     Types: 1 Glasses of wine per week     Comment: socially       Social History     Social History Narrative     with 3 kids    Stay at home mom, 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 General: stated age, well developed, well nourished and in NAD  Neck: supple, symmetrical, trachea midline, no adenopathy and thyroid: not enlarged, symmetric, no tenderness/mass/nodules  Lungs:  clear to auscultation w/o rales, rhonchi, wheezes w/normal effort and no use of accessory muscles of respiration   Heart: regular rate and rhythm, S1, S2 normal, no murmur, click, rub or gallop  Ext:  No edema noted.    Lymph: no cervical adenopathy appreciated  Skin:  Normal. and no rash or abnormalities   Psych: alert and oriented to person, place, time and situation and Speech: appropriate quality, quantity and organization of sentences      Assessment/Plan:       ICD-10-CM ICD-9-CM    1. Weight loss, unintentional R63.4 783.21    2. Current moderate episode of major depressive disorder without prior episode (HCC) F32.1 296.22    3. Anxiety F41.9 300.00    4. Insomnia, unspecified type G47.00 780.52         Orders Placed This Encounter    sertraline (ZOLOFT) 50 mg tablet       Doing much better, weight back up a bit and is good  Appetite and sleep have gradually and even just recently gotten better  Continue zoloft, could possibly wean off early next summer which will be about 9 months of remission  Don't anticipate she wlil need it long-term  Reviewed worrisome signs or symptoms for which to call. Flu shot today    Discussed the diagnosis and plan and she expressed understanding. Follow-up and Dispositions    · Return in about 4 months (around 12/30/2019) for Follow up.          Jose G Schaefer MD

## 2019-08-30 NOTE — PROGRESS NOTES
Chief Complaint   Patient presents with    Depression    Weight Loss     no further wt. loss, has gained 2 lbs       Reviewed Record in preparation for visit and have obtained necessary documentation. Identified pt with two pt identifiers (Name @ )    Health Maintenance Due   Topic    Influenza Age 5 to Adult          1. Have you been to the ER, urgent care clinic since your last visit? Hospitalized since your last visit? no    2. Have you seen or consulted any other health care providers outside of the [address] [place] [address] since your last visit? Include any pap smears or colon screening.  no

## 2019-08-30 NOTE — PATIENT INSTRUCTIONS

## 2019-09-05 ENCOUNTER — OFFICE VISIT (OUTPATIENT)
Dept: FAMILY MEDICINE CLINIC | Age: 39
End: 2019-09-05

## 2019-09-05 VITALS
DIASTOLIC BLOOD PRESSURE: 76 MMHG | TEMPERATURE: 98 F | RESPIRATION RATE: 16 BRPM | HEIGHT: 66 IN | BODY MASS INDEX: 22.02 KG/M2 | OXYGEN SATURATION: 98 % | SYSTOLIC BLOOD PRESSURE: 112 MMHG | WEIGHT: 137 LBS | HEART RATE: 90 BPM

## 2019-09-05 DIAGNOSIS — K12.0 APHTHOUS ULCER OF MOUTH: Primary | ICD-10-CM

## 2019-09-05 DIAGNOSIS — L30.9 DERMATITIS: ICD-10-CM

## 2019-09-05 RX ORDER — TRIAMCINOLONE ACETONIDE 1 MG/G
PASTE DENTAL
Qty: 5 G | Refills: 0 | Status: SHIPPED | OUTPATIENT
Start: 2019-09-05 | End: 2020-06-09

## 2019-09-05 NOTE — PROGRESS NOTES
Chief Complaint   Patient presents with    Facial Swelling     left side of face hard, lumpy and redness with itching and soreness . throat discomfort with nodule and nodule in front of ear of left side of face     Mouth Lesions     canker sore in mouth for 10 days , left side of mouth      1. Have you been to the ER, urgent care clinic since your last visit? Hospitalized since your last visit? No    2. Have you seen or consulted any other health care providers outside of the [facility] since your last visit? Include any pap smears or colon screening.  No

## 2019-09-08 ENCOUNTER — OFFICE VISIT (OUTPATIENT)
Dept: URGENT CARE | Age: 39
End: 2019-09-08

## 2019-09-08 VITALS
WEIGHT: 137 LBS | HEIGHT: 66 IN | SYSTOLIC BLOOD PRESSURE: 141 MMHG | BODY MASS INDEX: 22.02 KG/M2 | TEMPERATURE: 98 F | DIASTOLIC BLOOD PRESSURE: 80 MMHG | OXYGEN SATURATION: 98 % | HEART RATE: 89 BPM | RESPIRATION RATE: 16 BRPM

## 2019-09-08 DIAGNOSIS — L30.9 DERMATITIS: ICD-10-CM

## 2019-09-08 DIAGNOSIS — K12.0 APHTHOUS ULCER OF MOUTH: Primary | ICD-10-CM

## 2019-09-08 RX ORDER — LIDOCAINE HYDROCHLORIDE 20 MG/ML
15 SOLUTION OROPHARYNGEAL AS NEEDED
Qty: 1 BOTTLE | Refills: 0 | Status: SHIPPED | OUTPATIENT
Start: 2019-09-08 | End: 2020-06-09

## 2019-09-08 RX ORDER — AMOXICILLIN 875 MG/1
875 TABLET, FILM COATED ORAL 2 TIMES DAILY
Qty: 10 TAB | Refills: 0 | Status: SHIPPED | OUTPATIENT
Start: 2019-09-08 | End: 2019-09-13

## 2019-09-08 NOTE — PATIENT INSTRUCTIONS
Canker Sore: Care Instructions  Your Care Instructions  Canker sores are painful white sores in the mouth. They usually begin with a tingling feeling, followed by a red spot or bump that turns white. Canker sores appear most often on the tongue, inside the cheeks, and inside the lips. They can be very painful and can make talking, eating, and drinking difficult. A canker sore may form after an injury or stretching of tissues in the mouth, which can happen, for example, during a dental procedure or teeth cleaning. If you accidentally bite your tongue or the inside of your cheek, you may end up with a canker sore. Other possible causes are infection, certain foods, and stress. Canker sores are not contagious. The pain from your canker sore should decrease in 7 to 10 days, and it should heal completely in 1 to 3 weeks. In most cases, a canker sore will go away by itself. Home treatment can ease pain and discomfort. If you have a large or deep canker sore that does not seem to be getting better after 2 weeks, your doctor may prescribe medicine. Canker sores often come back again. Follow-up care is a key part of your treatment and safety. Be sure to make and go to all appointments, and call your doctor if you are having problems. It's also a good idea to know your test results and keep a list of the medicines you take. How can you care for yourself at home? · Drink cold liquids, such as water or iced tea, or eat flavored ice pops or frozen juices. Use a straw to keep the liquid from coming in contact with your canker sore. · Eat soft, bland foods that are easy to chew and swallow, such as ice cream, custard, applesauce, cottage cheese, macaroni and cheese, soft-cooked eggs, yogurt, or cream soups. · Cut foods into small pieces, or grind, mash, blend, or puree foods to make them easier to chew and swallow.   · While your canker sore heals, avoid coffee, chocolate, spicy and salty foods, citrus fruits, nuts, seeds, and tomatoes. · To soothe your canker sore and help it heal:  ? Use an over-the-counter numbing medicine, such as Orabase or Anbesol. ? Dab a bit of Milk of Magnesia on the canker sore 3 or 4 times a day. · Put ice on your sore to reduce the pain. · Take anti-inflammatory medicines to reduce pain, as needed. These include ibuprofen (Advil, Motrin) and naproxen (Aleve). Read and follow all instructions on the label. · Use a soft-bristle toothbrush, and brush your teeth well but carefully. · Do not smoke or use spit tobacco. Tobacco can cause mouth problems and slow healing. If you need help quitting, talk to your doctor about stop-smoking programs and medicines. These can increase your chances of quitting for good. When should you call for help? Call your doctor now or seek immediate medical care if:    · You have signs of infection, such as:  ? Increased pain, swelling, warmth, or redness. ? Red streaks leading from the area. ? Pus draining from the area. ? A fever.    Watch closely for changes in your health, and be sure to contact your doctor if:    · You do not get better as expected. Where can you learn more? Go to http://elsy-pacheco.info/. Enter R546 in the search box to learn more about \"Canker Sore: Care Instructions. \"  Current as of: October 3, 2018  Content Version: 12.1  © 2820-5409 [employer]. Care instructions adapted under license by [employer] (which disclaims liability or warranty for this information). If you have questions about a medical condition or this instruction, always ask your healthcare professional. [place] [address] any warranty or liability for your use of this information.

## 2019-09-08 NOTE — PROGRESS NOTES
The history is provided by the patient. Mouth Lesions   This is a new problem. The current episode started more than 1 week ago. The problem occurs constantly. The problem has been gradually worsening. Pertinent negatives include no chest pain and no headaches. Associated symptoms comments: Left side mouth pain. Nothing aggravates the symptoms. Nothing relieves the symptoms. Treatments tried: dental paste. The treatment provided mild relief. Patient was previously seen by PCP on 9/5/2019 and diagnosed with a mouth ulcer and dermatitis and given dental paste. Patient states the paste has helped the affected area but she has now developed another ulcer on the opposite side of facial bump on left cheek. Patient has been using cortisone left cheek bump and states it is now dry with mild itchiness. Patient states that she is having pain 5/10 that radiates up towards the left ear and down left jaw line. Patient denies sore throat/pain or left inner ear pain.     Past Medical History:   Diagnosis Date    Current moderate episode of major depressive disorder without prior episode ([facility] [address] 7/18/2019    Essential hypertension      1 st pregnancy few high blood pressure, 24 hour urine normal - few days before delivery    H/O maternal fourth degree perineal laceration, currently pregnant 10/7/2012    History of PCOS     Infertility, female     clomid pregnancy    Polycystic disease, ovaries     previously on metformin    Postpartum depression     saw a counselor        Past Surgical History:   Procedure Laterality Date    HX GYN      fourth degree laceration         Family History   Problem Relation Age of Onset    Hypertension Father     Heart Disease Father     Diabetes Father         Insluin dependendent (early onset)    Colon Cancer Maternal Grandmother [de-identified]    Cancer Maternal Grandfather 58        Melanoma    Cancer Mother         endometrial    Colon Polyps Mother     Colon Polyps Brother Social History     Socioeconomic History    Marital status:      Spouse name: Enrrique Cuevas    Number of children: 0    Years of education: Not on file    Highest education level: Not on file   Occupational History    Occupation:      Employer: [employer]   Social Needs    Financial resource strain: Not on file    Food insecurity:     Worry: Not on file     Inability: Not on file    Transportation needs:     Medical: Not on file     Non-medical: Not on file   Tobacco Use    Smoking status: Never Smoker    Smokeless tobacco: Never Used   Substance and Sexual Activity    Alcohol use: Yes     Alcohol/week: 1.0 standard drinks     Types: 1 Glasses of wine per week     Comment: socially     Drug use: No    Sexual activity: Yes     Partners: Male     Birth control/protection: None   Lifestyle    Physical activity:     Days per week: Not on file     Minutes per session: Not on file    Stress: Not on file   Relationships    Social connections:     Talks on phone: Not on file     Gets together: Not on file     Attends Rastafarian service: Not on file     Active member of club or organization: Not on file     Attends meetings of clubs or organizations: Not on file     Relationship status: Not on file    Intimate partner violence:     Fear of current or ex partner: Not on file     Emotionally abused: Not on file     Physically abused: Not on file     Forced sexual activity: Not on file   Other Topics Concern    Not on file   Social History Narrative     with 3 kids    Stay at home mom, 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 Review of Systems   Constitutional: Negative for chills, fatigue and fever. HENT: Positive for mouth sores (left side). Negative for dental problem, drooling, ear discharge, ear pain, facial swelling, sore throat, tinnitus and trouble swallowing. Eyes: Negative. Respiratory: Negative for cough. Cardiovascular: Negative for chest pain. Gastrointestinal: Negative. Musculoskeletal: Negative. Skin: Positive for rash (Dermatits to left check and left side mouth ulcers). Neurological: Negative for dizziness, weakness, light-headedness, numbness and headaches. Vitals:    09/08/19 1553 09/08/19 1559   BP:  141/80   Pulse:  89   Resp:  16   Temp:  98 °F (36.7 °C)   SpO2:  98%   Weight: 137 lb (62.1 kg)    Height: 5' 6\" (1.676 m)        Physical Exam   Constitutional: She is oriented to person, place, and time. She appears well-developed and well-nourished. HENT:   Head: Normocephalic. Right Ear: External ear normal.   Left Ear: External ear normal.   Mouth/Throat: No oropharyngeal exudate. Red mouth ulcer lower left gum and upper left side gum directly opposite of bump on upper left check. Bump to left check dry and hard upon touch. No oozing or drainage noted. Neck: Normal range of motion. Cardiovascular: Normal rate, regular rhythm and normal heart sounds. Pulmonary/Chest: Effort normal and breath sounds normal.   Abdominal: Soft. Bowel sounds are normal.   Musculoskeletal: Normal range of motion. Lymphadenopathy:     She has no cervical adenopathy. Neurological: She is alert and oriented to person, place, and time. Skin: Skin is warm and dry. There is erythema. Psychiatric: She has a normal mood and affect. MDM     Differential Diagnosis; Clinical Impression; Plan:     (K12.0) Aphthous ulcer of mouth  (primary encounter diagnosis)  (L30.9) Dermatitis  Orders Placed This Encounter      lidocaine (XYLOCAINE) 2 % solution          Sig: Take 15 mL by mouth as needed for Pain. Dispense:  1 Bottle          Refill:  0      amoxicillin (AMOXIL) 875 mg tablet          Sig: Take 1 Tab by mouth two (2) times a day for 5 days.           Dispense:  10 Tab          Refill:  0    Advised patient to continue dental paste and call PCP to inform of worsening symptoms. Advised to use tylenol or ibuprofen to help with pain/discomfort. The patients condition was discussed with the patient and they understand. The patient is to follow up with PCP. If signs and symptoms become worse the pt is to go to the ER. The patient is to take medications as prescribed. AVS given with patient instructions upon discharge.                   Procedures

## 2019-09-09 ENCOUNTER — OFFICE VISIT (OUTPATIENT)
Dept: FAMILY MEDICINE CLINIC | Age: 39
End: 2019-09-09

## 2019-09-09 VITALS
WEIGHT: 138.2 LBS | DIASTOLIC BLOOD PRESSURE: 90 MMHG | BODY MASS INDEX: 22.21 KG/M2 | SYSTOLIC BLOOD PRESSURE: 124 MMHG | TEMPERATURE: 98.2 F | HEART RATE: 87 BPM | RESPIRATION RATE: 16 BRPM | OXYGEN SATURATION: 98 % | HEIGHT: 66 IN

## 2019-09-09 DIAGNOSIS — K12.0 APHTHOUS ULCER: Primary | ICD-10-CM

## 2019-09-09 DIAGNOSIS — L98.9 FACIAL SKIN LESION: ICD-10-CM

## 2019-09-09 DIAGNOSIS — K06.9 DISORDER OF GUMS: ICD-10-CM

## 2019-09-09 RX ORDER — LIDOCAINE HYDROCHLORIDE 20 MG/ML
JELLY TOPICAL
Qty: 5 ML | Refills: 0 | Status: SHIPPED | OUTPATIENT
Start: 2019-09-09 | End: 2020-06-09

## 2019-09-09 NOTE — PROGRESS NOTES
Chief Complaint   Patient presents with    Mouth Lesions     follow up from [facility] for Aphthaus Mouth Ulcer 9/8/2019     1. Have you been to the ER, urgent care clinic since your last visit? Hospitalized since your last visit? No    2. Have you seen or consulted any other health care providers outside of the [address] [place] [address] since your last visit? Include any pap smears or colon screening.  No

## 2019-09-09 NOTE — PATIENT INSTRUCTIONS
Canker Sore: Care Instructions  Your Care Instructions  Canker sores are painful white sores in the mouth. They usually begin with a tingling feeling, followed by a red spot or bump that turns white. Canker sores appear most often on the tongue, inside the cheeks, and inside the lips. They can be very painful and can make talking, eating, and drinking difficult. A canker sore may form after an injury or stretching of tissues in the mouth, which can happen, for example, during a dental procedure or teeth cleaning. If you accidentally bite your tongue or the inside of your cheek, you may end up with a canker sore. Other possible causes are infection, certain foods, and stress. Canker sores are not contagious. The pain from your canker sore should decrease in 7 to 10 days, and it should heal completely in 1 to 3 weeks. In most cases, a canker sore will go away by itself. Home treatment can ease pain and discomfort. If you have a large or deep canker sore that does not seem to be getting better after 2 weeks, your doctor may prescribe medicine. Canker sores often come back again. Follow-up care is a key part of your treatment and safety. Be sure to make and go to all appointments, and call your doctor if you are having problems. It's also a good idea to know your test results and keep a list of the medicines you take. How can you care for yourself at home? · Drink cold liquids, such as water or iced tea, or eat flavored ice pops or frozen juices. Use a straw to keep the liquid from coming in contact with your canker sore. · Eat soft, bland foods that are easy to chew and swallow, such as ice cream, custard, applesauce, cottage cheese, macaroni and cheese, soft-cooked eggs, yogurt, or cream soups. · Cut foods into small pieces, or grind, mash, blend, or puree foods to make them easier to chew and swallow.   · While your canker sore heals, avoid coffee, chocolate, spicy and salty foods, citrus fruits, nuts, seeds, and tomatoes. · To soothe your canker sore and help it heal:  ? Use an over-the-counter numbing medicine, such as Orabase or Anbesol. ? Dab a bit of Milk of Magnesia on the canker sore 3 or 4 times a day. · Put ice on your sore to reduce the pain. · Take anti-inflammatory medicines to reduce pain, as needed. These include ibuprofen (Advil, Motrin) and naproxen (Aleve). Read and follow all instructions on the label. · Use a soft-bristle toothbrush, and brush your teeth well but carefully. · Do not smoke or use spit tobacco. Tobacco can cause mouth problems and slow healing. If you need help quitting, talk to your doctor about stop-smoking programs and medicines. These can increase your chances of quitting for good. When should you call for help? Call your doctor now or seek immediate medical care if:    · You have signs of infection, such as:  ? Increased pain, swelling, warmth, or redness. ? Red streaks leading from the area. ? Pus draining from the area. ? A fever.    Watch closely for changes in your health, and be sure to contact your doctor if:    · You do not get better as expected. Where can you learn more? Go to http://elsy-pacheco.info/. Enter P097 in the search box to learn more about \"Canker Sore: Care Instructions. \"  Current as of: October 3, 2018  Content Version: 12.1  © 0335-2620 [employer]. Care instructions adapted under license by [employer] (which disclaims liability or warranty for this information). If you have questions about a medical condition or this instruction, always ask your healthcare professional. [place] [address] any warranty or liability for your use of this information. Stomatitis: Care Instructions  Your Care Instructions  Stomatitis is swelling and redness of the lining of your mouth. It can cause painful sores that can make it hard for you to eat, drink, or swallow.   Stomatitis may be caused by a viral or bacterial infection, a disease, or not taking care of your teeth and gums properly. Other causes include reactions to smoking, burns from hot food or drinks, or an allergic reaction. Allergy causes may be a result of flavorings such as spearmint or cinnamon that are used in chewing gum, toothpaste, soft drinks, candies, and other products. Your doctor may prescribe pain medicines or special mouth rinses. He or she may tell you to quit using some products that may be causing the sores. It can take up to 2 weeks for the sores to heal.  Some people with stomatitis also get a yeast infection of the mouth, called thrush. Medicines can treat this problem. Follow-up care is a key part of your treatment and safety. Be sure to make and go to all appointments, and call your doctor if you are having problems. It's also a good idea to know your test results and keep a list of the medicines you take. How can you care for yourself at home? · Be safe with medicines. Read and follow all instructions on the label. ? If the doctor gave you a prescription medicine for pain, take it as prescribed. ? If you are not taking a prescription pain medicine, ask your doctor if you can take an over-the-counter medicine. · If your doctor prescribed antibiotics, take them as directed. Do not stop taking them just because you feel better. You need to take the full course of antibiotics. · Use prescription mouth rinses as prescribed. Ask your doctor if you can freeze the mouth rinse in an ice cube tray. Sucking on a frozen cube of the mouth rinse can help ease the pain. · Make a rinse to keep your mouth from getting dry. Add 1 teaspoon baking soda and ½ teaspoon salt to a quart of water. Use it to rinse your mouth 4 to 6 times each day. Spit out the rinse. Do not swallow it. · Do not use a mouthwash or other over-the-counter rinse with alcohol. These can dry out your mouth or cause more pain.   · If your doctor gave you mouthwash or lozenges to treat your yeast infection, use them as directed. · Eat soft foods such as mashed potatoes and other cooked vegetables, noodles, applesauce, clear broth soups, yogurt, and cottage cheese. You can get extra protein by adding protein powder to milk shakes or breakfast drinks. Avoid eating spicy or crunchy foods. · Try eating cold foods such as ice cream or yogurt. · Avoid drinking high-acid juices such as orange, grapefruit, and cranberry juices. · Drink plenty of fluids to prevent dehydration. Drinking through a straw may help with pain. · Practice good oral hygiene. Use a very soft toothbrush to brush your teeth at least two times a day. Or use your finger to brush your teeth if your mouth is sore. When should you call for help? Call your doctor now or seek immediate medical care if:    · You have new or worse symptoms of infection, such as:  ? Increased pain, swelling, warmth, or redness. ? Red streaks leading from the area. ? Pus draining from the area. ? A fever.    Watch closely for changes in your health, and be sure to contact your doctor if:    · You do not get better as expected. Where can you learn more? Go to http://elsy-pacheco.info/. Enter K715 in the search box to learn more about \"Stomatitis: Care Instructions. \"  Current as of: October 3, 2018  Content Version: 12.1  © 9850-3447 [employer]. Care instructions adapted under license by [employer] (which disclaims liability or warranty for this information). If you have questions about a medical condition or this instruction, always ask your healthcare professional. [place] [address] any warranty or liability for your use of this information. Antibiotics for Skin Conditions: Care Instructions  Your Care Instructions    Antibiotics are medicines that kill bacteria. Bacteria can cause some skin problems or conditions, such as impetigo.  They can also lead to problems like acne. There are many types of antibiotics. Each works a little differently and acts on different types of bacteria. Your doctor will decide which medicine will work best for you. You can put an antibiotic ointment or cream on your skin. Or you can take pills by mouth to kill bacteria in your skin pores. Antibiotics are not used to treat skin problems that are caused by viruses or allergies. But sometimes bacteria get into a skin problem you already have. Then you may need this medicine. Follow-up care is a key part of your treatment and safety. Be sure to make and go to all appointments, and call your doctor if you are having problems. It's also a good idea to know your test results and keep a list of the medicines you take. How can you care for yourself at home? To take antibiotics  · If your doctor prescribed pills, take them as directed. Do not stop taking them just because your skin problem gets better. You need to take the full course of antibiotics. · Apply antibiotic ointment exactly as instructed. · Read the label to learn how to store your medicine. · Do not use antibiotics that were prescribed for a different illness or for someone else. You may take longer to heal. And your skin problem may get worse. To take care of your skin  · Try not to scratch rashes or sores. Scratching may spread bacteria to other parts of your skin or body. · Clean your skin with mild soap and water 2 times a day unless your doctor gives you different instructions. Don't use hydrogen peroxide or alcohol, which can slow healing. · Protect your skin from the sun. Wear hats with wide brims, sunglasses, and loose-fitting, tightly woven clothing that covers your arms and legs. Make sure to use a broad-spectrum sunscreen that has a sun protection factor (SPF) of 30 or higher. Apply it several times a day. What are the possible side effects? Many people do not have side effects from antibiotics. But sometimes people have problems, such as:  · Nausea, diarrhea, and belly pain. · Allergic reactions, such as a skin rash. · Vaginal yeast infections. If the side effects bother you, ask your doctor if there is another antibiotic that will work as well but will not cause these effects. When should you call for help? Call your doctor now or seek immediate medical care if:    · You have signs of an infection, such as:  ? Increased pain, swelling, warmth, and redness. ? Red streaks leading from the affected area. ? Pus draining from the area. ? A fever.    Watch closely for changes in your health, and be sure to contact your doctor if:    · You think you may be having a problem with the medicine.     · You do not get better as expected. Where can you learn more? Go to http://elsy-pacheco.info/. Enter G945 in the search box to learn more about \"Antibiotics for Skin Conditions: Care Instructions. \"  Current as of: April 1, 2019  Content Version: 12.1  © 6307-2326 [employer]. Care instructions adapted under license by [employer] (which disclaims liability or warranty for this information). If you have questions about a medical condition or this instruction, always ask your healthcare professional. [address] any warranty or liability for your use of this information.

## 2019-09-16 ENCOUNTER — APPOINTMENT (OUTPATIENT)
Dept: CT IMAGING | Age: 39
End: 2019-09-16
Attending: PHYSICIAN ASSISTANT
Payer: COMMERCIAL

## 2019-09-16 ENCOUNTER — HOSPITAL ENCOUNTER (EMERGENCY)
Age: 39
Discharge: HOME | End: 2019-09-16
Attending: EMERGENCY MEDICINE | Admitting: EMERGENCY MEDICINE
Payer: COMMERCIAL

## 2019-09-16 ENCOUNTER — NURSE TRIAGE (OUTPATIENT)
Dept: OTHER | Facility: CLINIC | Age: 39
End: 2019-09-16

## 2019-09-16 VITALS
DIASTOLIC BLOOD PRESSURE: 94 MMHG | SYSTOLIC BLOOD PRESSURE: 145 MMHG | HEART RATE: 72 BPM | OXYGEN SATURATION: 100 % | TEMPERATURE: 98.3 F | RESPIRATION RATE: 18 BRPM

## 2019-09-16 DIAGNOSIS — L03.211 FACIAL CELLULITIS: Primary | ICD-10-CM

## 2019-09-16 LAB
ANION GAP SERPL CALC-SCNC: 8 MMOL/L (ref 5–15)
BASOPHILS # BLD: 0.1 K/UL (ref 0–0.1)
BASOPHILS NFR BLD: 1 % (ref 0–1)
BUN SERPL-MCNC: 20 MG/DL (ref 6–20)
BUN/CREAT SERPL: 24 (ref 12–20)
CALCIUM SERPL-MCNC: 8.8 MG/DL (ref 8.5–10.1)
CHLORIDE SERPL-SCNC: 108 MMOL/L (ref 97–108)
CO2 SERPL-SCNC: 25 MMOL/L (ref 21–32)
COMMENT, HOLDF: NORMAL
CREAT SERPL-MCNC: 0.85 MG/DL (ref 0.55–1.02)
DIFFERENTIAL METHOD BLD: NORMAL
EOSINOPHIL # BLD: 0 K/UL (ref 0–0.4)
EOSINOPHIL NFR BLD: 0 % (ref 0–7)
ERYTHROCYTE [DISTWIDTH] IN BLOOD BY AUTOMATED COUNT: 14 % (ref 11.5–14.5)
GLUCOSE SERPL-MCNC: 113 MG/DL (ref 65–100)
HCT VFR BLD AUTO: 42.6 % (ref 35–47)
HGB BLD-MCNC: 13.6 G/DL (ref 11.5–16)
IMM GRANULOCYTES # BLD AUTO: 0 K/UL (ref 0–0.04)
IMM GRANULOCYTES NFR BLD AUTO: 0 % (ref 0–0.5)
LYMPHOCYTES # BLD: 1.9 K/UL (ref 0.8–3.5)
LYMPHOCYTES NFR BLD: 26 % (ref 12–49)
MCH RBC QN AUTO: 27.3 PG (ref 26–34)
MCHC RBC AUTO-ENTMCNC: 31.9 G/DL (ref 30–36.5)
MCV RBC AUTO: 85.4 FL (ref 80–99)
MONOCYTES # BLD: 0.4 K/UL (ref 0–1)
MONOCYTES NFR BLD: 5 % (ref 5–13)
NEUTS SEG # BLD: 5.1 K/UL (ref 1.8–8)
NEUTS SEG NFR BLD: 68 % (ref 32–75)
NRBC # BLD: 0 K/UL (ref 0–0.01)
NRBC BLD-RTO: 0 PER 100 WBC
PLATELET # BLD AUTO: 218 K/UL (ref 150–400)
PMV BLD AUTO: 11.4 FL (ref 8.9–12.9)
POTASSIUM SERPL-SCNC: 3.3 MMOL/L (ref 3.5–5.1)
RBC # BLD AUTO: 4.99 M/UL (ref 3.8–5.2)
SAMPLES BEING HELD,HOLD: NORMAL
SODIUM SERPL-SCNC: 141 MMOL/L (ref 136–145)
WBC # BLD AUTO: 7.5 K/UL (ref 3.6–11)

## 2019-09-16 PROCEDURE — 70487 CT MAXILLOFACIAL W/DYE: CPT

## 2019-09-16 PROCEDURE — 80048 BASIC METABOLIC PNL TOTAL CA: CPT

## 2019-09-16 PROCEDURE — 85025 COMPLETE CBC W/AUTO DIFF WBC: CPT

## 2019-09-16 PROCEDURE — 74011636320 HC RX REV CODE- 636/320: Performed by: RADIOLOGY

## 2019-09-16 PROCEDURE — 36415 COLL VENOUS BLD VENIPUNCTURE: CPT

## 2019-09-16 PROCEDURE — 96360 HYDRATION IV INFUSION INIT: CPT

## 2019-09-16 PROCEDURE — 99283 EMERGENCY DEPT VISIT LOW MDM: CPT

## 2019-09-16 PROCEDURE — 74011000258 HC RX REV CODE- 258: Performed by: RADIOLOGY

## 2019-09-16 RX ORDER — SODIUM CHLORIDE 0.9 % (FLUSH) 0.9 %
10 SYRINGE (ML) INJECTION
Status: COMPLETED | OUTPATIENT
Start: 2019-09-16 | End: 2019-09-16

## 2019-09-16 RX ORDER — CEPHALEXIN 500 MG/1
500 CAPSULE ORAL 4 TIMES DAILY
Qty: 28 CAP | Refills: 0 | Status: SHIPPED | OUTPATIENT
Start: 2019-09-16 | End: 2019-09-23

## 2019-09-16 RX ADMIN — IOPAMIDOL 100 ML: 612 INJECTION, SOLUTION INTRAVENOUS at 20:10

## 2019-09-16 RX ADMIN — Medication 10 ML: at 20:10

## 2019-09-16 RX ADMIN — SODIUM CHLORIDE 100 ML: 900 INJECTION, SOLUTION INTRAVENOUS at 20:10

## 2019-09-17 NOTE — ED NOTES
Discharge instructions given to patient by Trena Moura. All question answered and patient verbalized understanding.   Patient ambulatory to ED [facility]

## 2019-09-17 NOTE — ED PROVIDER NOTES
48-year-old female presents emergency department for left cheek pain and redness. Patient states that her symptoms began 3 weeks ago. She was seen by dermatology and placed on antibiotics for suspected MRSA infection. Patient states she received a call back from dermatology and states that her culture results were negative for MRSA. She is still on an antibiotic for this infection. Patient also states that she is having some pain in her left shoulder and is concerned about a red spot that she noticed a couple of days ago. Denies any jaw pain chest pain shortness of breath hemoptysis leg swelling fever chills rash or any other medical complaints at this time.     Nohemi Nelson PA-C           Past Medical History:   Diagnosis Date    Current moderate episode of major depressive disorder without prior episode ([facility] [address] 7/18/2019    Essential hypertension      1 st pregnancy few high blood pressure, 24 hour urine normal - few days before delivery    H/O maternal fourth degree perineal laceration, currently pregnant 10/7/2012    History of PCOS     Infertility, female     clomid pregnancy    Polycystic disease, ovaries     previously on metformin    Postpartum depression     saw a counselor       Past Surgical History:   Procedure Laterality Date    HX GYN      fourth degree laceration         Family History:   Problem Relation Age of Onset    Hypertension Father     Heart Disease Father     Diabetes Father         [place] dependendent (early onset)    Colon Cancer Maternal Grandmother [de-identified]    Cancer Maternal Grandfather 58        Melanoma    Cancer Mother         endometrial    Colon Polyps Mother     Colon Polyps Brother        Social History     Socioeconomic History    Marital status:      Spouse name: Marcin Kang    Number of children: 0    Years of education: Not on file    Highest education level: Not on file   Occupational History    Occupation:      Employer: [employer] Social Needs    Financial resource strain: Not on file    Food insecurity:     Worry: Not on file     Inability: Not on file    Transportation needs:     Medical: Not on file     Non-medical: Not on file   Tobacco Use    Smoking status: Never Smoker    Smokeless tobacco: Never Used   Substance and Sexual Activity    Alcohol use: Yes     Alcohol/week: 1.0 standard drinks     Types: 1 Glasses of wine per week     Comment: socially     Drug use: No    Sexual activity: Yes     Partners: Male     Birth control/protection: None   Lifestyle    Physical activity:     Days per week: Not on file     Minutes per session: Not on file    Stress: Not on file   Relationships    Social connections:     Talks on phone: Not on file     Gets together: Not on file     Attends Christianity service: Not on file     Active member of club or organization: Not on file     Attends meetings of clubs or organizations: Not on file     Relationship status: Not on file    Intimate partner violence:     Fear of current or ex partner: Not on file     Emotionally abused: Not on file     Physically abused: Not on file     Forced sexual activity: Not on file   Other Topics Concern    Not on file   Social History Narrative     with 3 kids    Stay at home mom, 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 Review of Systems   Constitutional: Negative for chills, diaphoresis and fever. HENT: Negative for congestion, postnasal drip, rhinorrhea and sore throat. Eyes: Negative for photophobia, discharge, redness and visual disturbance. Respiratory: Negative for cough, chest tightness, shortness of breath and wheezing. Cardiovascular: Negative for chest pain, palpitations and leg swelling. Gastrointestinal: Negative for abdominal distention, abdominal pain, blood in stool, constipation, diarrhea, nausea and vomiting. Genitourinary: Negative for difficulty urinating, dysuria, frequency, hematuria and urgency. Musculoskeletal: Negative for arthralgias, back pain, joint swelling and myalgias. Skin: Positive for rash. Negative for color change. Neurological: Negative for dizziness, speech difficulty, weakness, light-headedness, numbness and headaches. Psychiatric/Behavioral: Negative for confusion. The patient is not nervous/anxious. All other systems reviewed and are negative. Vitals:    09/16/19 1619 09/16/19 1731 09/16/19 2112   BP:  148/75 (!) 145/94   Pulse: (!) 116 (!) 108 72   Resp:  18 18   Temp:  98 °F (36.7 °C) 98.3 °F (36.8 °C)   SpO2: 99% 100% 100%            Physical Exam   Constitutional: She is oriented to person, place, and time. She appears well-developed and well-nourished. No distress. HENT:   Head: Normocephalic and atraumatic. Head is without raccoon's eyes, without Morgan's sign and without laceration. Right Ear: Hearing, tympanic membrane, external ear and ear canal normal. No foreign bodies. Tympanic membrane is not bulging. No hemotympanum. Left Ear: Hearing, tympanic membrane, external ear and ear canal normal. No foreign bodies. Tympanic membrane is not bulging. No hemotympanum. Nose: Nose normal. No mucosal edema or rhinorrhea. Right sinus exhibits no maxillary sinus tenderness and no frontal sinus tenderness. Left sinus exhibits no maxillary sinus tenderness and no frontal sinus tenderness. Mouth/Throat: Uvula is midline, oropharynx is clear and moist and mucous membranes are normal. No tonsillar abscesses. Eyes: Pupils are equal, round, and reactive to light. Conjunctivae and EOM are normal. Right eye exhibits no discharge. Left eye exhibits no discharge. Neck: Normal range of motion. Neck supple. Cardiovascular: Normal rate, regular rhythm and normal heart sounds. Exam reveals no gallop and no friction rub. No murmur heard. Regular rate and rhythm.   No murmurs, gallops, rubs, or clicks. Pulmonary/Chest: Effort normal and breath sounds normal. No respiratory distress. She has no wheezes. She has no rales. No stridor or wheezes. No accessory muscle usage. No nasal flaring. Breath Sounds equal bilaterally. Abdominal: Soft. Bowel sounds are normal. She exhibits no distension. There is no tenderness. There is no rebound and no guarding. No abdominal Bruits. No pulsatile mass. No abdominal scars. Active bowel sounds. Musculoskeletal: Normal range of motion. She exhibits no edema, tenderness or deformity. Patient has full range of motion of the left shoulder joint. No signs of septic arthritis fracture dislocation or any other acute medical emergencies. Neurological: She is alert and oriented to person, place, and time. Skin: She is not diaphoretic. Nursing note and vitals reviewed. MDM  Number of Diagnoses or Management Options  Facial cellulitis:   Diagnosis management comments: CT negative for any fluid collection. Suspect facial cellulitis. Will cover with Keflex and advise close follow-up with her doctor. Patient was also given strict return precautions.   Juan Luis Lockwood PA-C         Procedures

## 2019-09-17 NOTE — DISCHARGE INSTRUCTIONS
Patient Education        Cellulitis: Care Instructions  Your Care Instructions    Cellulitis is a skin infection caused by bacteria, most often strep or staph. It often occurs after a break in the skin from a scrape, cut, bite, or puncture, or after a rash. Cellulitis may be treated without doing tests to find out what caused it. But your doctor may do tests, if needed, to look for a specific bacteria, like methicillin-resistant Staphylococcus aureus (MRSA). The doctor has checked you carefully, but problems can develop later. If you notice any problems or new symptoms, get medical treatment right away. Follow-up care is a key part of your treatment and safety. Be sure to make and go to all appointments, and call your doctor if you are having problems. It's also a good idea to know your test results and keep a list of the medicines you take. How can you care for yourself at home? · Take your antibiotics as directed. Do not stop taking them just because you feel better. You need to take the full course of antibiotics. · Prop up the infected area on pillows to reduce pain and swelling. Try to keep the area above the level of your heart as often as you can. · If your doctor told you how to care for your wound, follow your doctor's instructions. If you did not get instructions, follow this general advice:  ? Wash the wound with clean water 2 times a day. Don't use hydrogen peroxide or alcohol, which can slow healing. ? You may cover the wound with a thin layer of petroleum jelly, such as Vaseline, and a nonstick bandage. ? Apply more petroleum jelly and replace the bandage as needed. · Be safe with medicines. Take pain medicines exactly as directed. ? If the doctor gave you a prescription medicine for pain, take it as prescribed. ? If you are not taking a prescription pain medicine, ask your doctor if you can take an over-the-counter medicine.   To prevent cellulitis in the future  · Try to prevent cuts, scrapes, or other injuries to your skin. Cellulitis most often occurs where there is a break in the skin. · If you get a scrape, cut, mild burn, or bite, wash the wound with clean water as soon as you can to help avoid infection. Don't use hydrogen peroxide or alcohol, which can slow healing. · If you have swelling in your legs (edema), support stockings and good skin care may help prevent leg sores and cellulitis. · Take care of your feet, especially if you have diabetes or other conditions that increase the risk of infection. Wear shoes and socks. Do not go barefoot. If you have athlete's foot or other skin problems on your feet, talk to your doctor about how to treat them. When should you call for help? Call your doctor now or seek immediate medical care if:    · You have signs that your infection is getting worse, such as:  ? Increased pain, swelling, warmth, or redness. ? Red streaks leading from the area. ? Pus draining from the area. ? A fever.     · You get a rash.    Watch closely for changes in your health, and be sure to contact your doctor if:    · You do not get better as expected. Where can you learn more? Go to http://elsy-pacheco.info/. Horace Tarango in the search box to learn more about \"Cellulitis: Care Instructions. \"  Current as of: April 1, 2019  Content Version: 12.1  © 0701-5503 Healthwise, [employer]. Care instructions adapted under license by [employer]: turn off the lights (which disclaims liability or warranty for this information). If you have questions about a medical condition or this instruction, always ask your healthcare professional. [address] any warranty or liability for your use of this information. We hope that we have addressed all of your medical concerns. The examination and treatment you received in the Emergency Department were for an emergent problem and were not intended as complete care.  It is important that you follow up with your healthcare provider(s) for ongoing care. If your symptoms worsen or do not improve as expected, and you are unable to reach your usual health care provider(s), you should return to the Emergency Department. Today's healthcare is undergoing tremendous change, and patient satisfaction surveys are one of the many tools to assess the quality of medical care. You may receive a survey from the [employer] organization regarding your experience in the Emergency Department. I hope that your experience has been completely positive, particularly the medical care that I provided. As such, please participate in the survey; anything less than excellent does not meet my expectations or intentions. [facility] and [facility] participate in nationally recognized quality of care measures. If your blood pressure is greater than 120/80, as reported below, we urge that you seek medical care to address the potential of high blood pressure, commonly known as hypertension. Hypertension can be hereditary or can be caused by certain medical conditions, pain, stress, or \"white coat syndrome. \"       Please make an appointment with your health care provider(s) for follow up of your Emergency Department visit. VITALS:   Patient Vitals for the past 8 hrs:   Temp Pulse Resp BP SpO2   09/16/19 1731 98 °F (36.7 °C) (!) 108 18 148/75 100 %   09/16/19 1619 -- (!) 116 -- -- 99 %          Thank you for allowing us to provide you with medical care today. We realize that you have many choices for your emergency care needs. Please choose us in the future for any continued health care needs. Andreia Mcrae, [address] [place] [address].   Office: [phone]            Recent Results (from the past 24 hour(s))   CBC WITH AUTOMATED DIFF    Collection Time: 09/16/19  5:39 PM   Result Value Ref Range    WBC 7.5 3.6 - 11.0 K/uL    RBC 4.99 3.80 - 5.20 M/uL    HGB 13.6 11.5 - 16.0 g/dL    HCT 42.6 35.0 - 47.0 %    MCV 85.4 80.0 - 99.0 FL    MCH 27.3 26.0 - 34.0 PG    MCHC 31.9 30.0 - 36.5 g/dL    RDW 14.0 11.5 - 14.5 %    PLATELET 441 070 - 327 K/uL    MPV 11.4 8.9 - 12.9 FL    NRBC 0.0 0  WBC    ABSOLUTE NRBC 0.00 0.00 - 0.01 K/uL    NEUTROPHILS 68 32 - 75 %    LYMPHOCYTES 26 12 - 49 %    MONOCYTES 5 5 - 13 %    EOSINOPHILS 0 0 - 7 %    BASOPHILS 1 0 - 1 %    IMMATURE GRANULOCYTES 0 0.0 - 0.5 %    ABS. NEUTROPHILS 5.1 1.8 - 8.0 K/UL    ABS. LYMPHOCYTES 1.9 0.8 - 3.5 K/UL    ABS. MONOCYTES 0.4 0.0 - 1.0 K/UL    ABS. EOSINOPHILS 0.0 0.0 - 0.4 K/UL    ABS. BASOPHILS 0.1 0.0 - 0.1 K/UL    ABS. IMM. GRANS. 0.0 0.00 - 0.04 K/UL    DF AUTOMATED     METABOLIC PANEL, BASIC    Collection Time: 09/16/19  5:39 PM   Result Value Ref Range    Sodium 141 136 - 145 mmol/L    Potassium 3.3 (L) 3.5 - 5.1 mmol/L    Chloride 108 97 - 108 mmol/L    CO2 25 21 - 32 mmol/L    Anion gap 8 5 - 15 mmol/L    Glucose 113 (H) 65 - 100 mg/dL    BUN 20 6 - 20 MG/DL    Creatinine 0.85 0.55 - 1.02 MG/DL    BUN/Creatinine ratio 24 (H) 12 - 20      GFR est AA >60 >60 ml/min/1.73m2    GFR est non-AA >60 >60 ml/min/1.73m2    Calcium 8.8 8.5 - 10.1 MG/DL   SAMPLES BEING HELD    Collection Time: 09/16/19  5:39 PM   Result Value Ref Range    SAMPLES BEING HELD 1RED 1BLUE     COMMENT        Add-on orders for these samples will be processed based on acceptable specimen integrity and analyte stability, which may vary by analyte. Ct Maxillofacial W Cont    Result Date: 9/16/2019  INDICATION: Facial pain. Skin problem. Left cheek staph infection. Exam: CT of the face is performed with 2.5 mm collimation after the intravenous administration of 100 mL of Isovue 300. Coronal and sagittal reformatted images were also obtained. CT dose reduction was achieved through the use of a standardized protocol tailored for this examination and automatic exposure control for dose modulation. Adaptive statistical iterative reconstruction (ASIR) was utilized. FINDINGS: There is mild thickening of the skin of the left cheek. There is subtle stranding of the subdermal fat of the left cheek. There is no focal fluid collection to suggest abscess. No acute fracture is visualized. Visualized articulations are normal. No air-fluid level is seen within the paranasal sinuses. The globes are intact bilaterally. Orbits are intact. Extraocular muscles and optic nerves are grossly normal. Visualized mastoid air cells are well pneumatized. IMPRESSION: Left cheek skin thickening and subtle stranding of the subdermal fat. No focal fluid collection to suggest abscess.

## 2020-03-02 RX ORDER — SERTRALINE HYDROCHLORIDE 50 MG/1
50 TABLET, FILM COATED ORAL DAILY
Qty: 90 TAB | Refills: 0 | Status: SHIPPED | OUTPATIENT
Start: 2020-03-02 | End: 2020-06-08 | Stop reason: SDUPTHER

## 2020-03-02 NOTE — TELEPHONE ENCOUNTER
LOV with Dr Porfirio Mcpherson was 8/30/19, was due in Dec nothing scheduled.   will put note on rx that she needs OV

## 2020-06-08 RX ORDER — SERTRALINE HYDROCHLORIDE 50 MG/1
50 TABLET, FILM COATED ORAL DAILY
Qty: 90 TAB | Refills: 0 | Status: SHIPPED | OUTPATIENT
Start: 2020-06-08 | End: 2020-08-31 | Stop reason: SDUPTHER

## 2020-06-08 NOTE — PATIENT INSTRUCTIONS

## 2020-06-08 NOTE — PROGRESS NOTES
Farooq Rubio [facility]  [address] [place], [address] [place] [address] Road  [phone]             Date of visit: 6/9/2020   Subjective:      History obtained from:  the patient. Belinda Vickers is a 35 y.o. female who presents today for   Chief Complaint   Patient presents with    Medication Refill     Zoloft     This service was provided through telehealth (doxy. me real-time video/audio) due to COVID-19 pandemic, with the patient being at home and the provider being at [facility] in [address] Others assisting in the telehealth encounter included none. 10 minutes were spent with the patient by the provider. she  and/or her healthcare decision maker is aware that this patient-initiated Telehealth encounter is a billable service, with coverage as determined by her insurance carrier. she  is aware that she  may receive a bill and has provided verbal consent to proceed: Yes, per PSR.      Last year had lost weight when she had decreased appetitie and major depression  Did well on zoloft, noting she felt like herself, not panicky, able to concentrate, able to eat, able to sleep better  Feeling really well and not wanting to wean it during this stressful pandemic time  Not having the racing heart  Plenty of stress but managing it well    Eating well  Still exercising but thinks she needs to do more, hard with the kdis being at home  Lots of bike rides and walks  Not as intense cardio    Heavy periods still a problem  Worse in past year  For now just managing it  Didn't like how she felt on birth control  Was not anemic when we checked last fall and says they are not worse  Not lasting long    Labs ok last year    Patient Active Problem List    Diagnosis Date Noted    Menorrhagia with regular cycle 07/18/2019    Current moderate episode of major depressive disorder without prior episode ([facility] [address] 07/18/2019    Family history of colonic polyps 07/18/2019    Insomnia 04/05/2017    Anxiety 04/05/2017    History of PCOS 07/10/2012     Current Outpatient Medications   Medication Sig Dispense Refill    sertraline (ZOLOFT) 50 mg tablet Take 1 Tab by mouth daily. 90 Tab 0     No Known Allergies  Past Medical History:   Diagnosis Date    Current moderate episode of major depressive disorder without prior episode ([facility] [address] 7/18/2019    Essential hypertension      1 st pregnancy few high blood pressure, 24 hour urine normal - few days before delivery    H/O maternal fourth degree perineal laceration, currently pregnant 10/7/2012    History of PCOS     Infertility, female     clomid pregnancy    Polycystic disease, ovaries     previously on metformin    Postpartum depression     saw a counselor     Past Surgical History:   Procedure Laterality Date    HX GYN      fourth degree laceration     Family History   Problem Relation Age of Onset    Hypertension Father     Heart Disease Father     Diabetes Father         Insluin dependendent (early onset)    Colon Cancer Maternal Grandmother [de-identified]    Cancer Maternal Grandfather 58        Melanoma    Cancer Mother         endometrial    Colon Polyps Mother     Colon Polyps Brother      Social History     Tobacco Use    Smoking status: Never Smoker    Smokeless tobacco: Never Used   Substance Use Topics    Alcohol use: Yes     Alcohol/week: 1.0 standard drinks     Types: 1 Glasses of wine per week     Comment: socially       Social History     Social History Narrative     with 3 kids    Stay at home mom, 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 There is no height or weight on file to calculate BMI.      General: stated age, well developed, well nourished and in NAD  Skin:  No lesions noted on video  Psych: alert and oriented to person, place, time and situation and Speech: appropriate quality, quantity and organization of sentences     Assessment/Plan:       ICD-10-CM ICD-9-CM    1. Menorrhagia with regular cycle N92.0 626.2    2. Current moderate episode of major depressive disorder without prior episode (HCC) F32.1 296.22    3. Anxiety F41.9 300.00    4. Insomnia, unspecified type G47.00 780.52         Menorrhagia manageable and maybe a little better  Not having anemia symptoms and her blood counts were normal last fall  Discussed options like motrin, tranexemic acid, IUD, she is ok just living with it for now  Depression and anxiety, insomnia all doing very well on zoloft  Not sure if she wll need long-term; probaby not, but she is hesitant to wean now during this stressful pandemic time  Reassured her med is safe, will re-eval next year  Discussed that best to wean in spring or summer when she is ready  She knows not to stop it all at once    Discussed the diagnosis and plan and she expressed understanding.     Follow-up and Dispositions    · Return in about 1 year (around 6/9/2021) for Full Anna Velazquez MD

## 2020-06-08 NOTE — TELEPHONE ENCOUNTER
Last visit 09/09/2019 MD Maria Jaramillo   Next appointment 06/09/2020 MD Carey   Previous refill encounter(s) 03/20/20 Zoloft #90     Requested Prescriptions     Pending Prescriptions Disp Refills    sertraline (ZOLOFT) 50 mg tablet 90 Tab 0     Sig: Take 1 Tab by mouth daily.

## 2020-06-09 ENCOUNTER — VIRTUAL VISIT (OUTPATIENT)
Dept: FAMILY MEDICINE CLINIC | Age: 40
End: 2020-06-09

## 2020-06-09 DIAGNOSIS — N92.0 MENORRHAGIA WITH REGULAR CYCLE: Primary | ICD-10-CM

## 2020-06-09 DIAGNOSIS — F32.1 CURRENT MODERATE EPISODE OF MAJOR DEPRESSIVE DISORDER WITHOUT PRIOR EPISODE (HCC): ICD-10-CM

## 2020-06-09 DIAGNOSIS — F41.9 ANXIETY: ICD-10-CM

## 2020-06-09 DIAGNOSIS — G47.00 INSOMNIA, UNSPECIFIED TYPE: ICD-10-CM

## 2020-06-09 NOTE — PROGRESS NOTES
Chief Complaint   Patient presents with    Medication Refill     Zoloft     1. Have you been to the ER, urgent care clinic since your last visit? Hospitalized since your last visit? No     2. Have you seen or consulted any other health care providers outside of the [address] [place] [address] since your last visit? Include any pap smears or colon screening.  No

## 2020-08-31 RX ORDER — SERTRALINE HYDROCHLORIDE 50 MG/1
50 TABLET, FILM COATED ORAL DAILY
Qty: 90 TAB | Refills: 0 | Status: SHIPPED | OUTPATIENT
Start: 2020-08-31 | End: 2020-09-01

## 2020-08-31 NOTE — TELEPHONE ENCOUNTER
MD Carey,    Patient call requesting 1 year rx for sertraline sent to [facility]. Attached if appropriate. Thanks, Christine Hua      Last Visit: VV 6/9/20  MD Carey  Next Appointment: Not scheduled  Previous Refill Encounter(s): 6/8/20  90    Requested Prescriptions     Pending Prescriptions Disp Refills    sertraline (ZOLOFT) 50 mg tablet 90 Tab 3     Sig: Take 1 Tab by mouth daily.

## 2020-09-25 NOTE — TELEPHONE ENCOUNTER
MD Carey,    Patient requesting 90 d/s. Refilled for 30 on 9/1/20 with request for appointment. Last virtual visit stated return due 6/2021. Please advise. Thanks, [facility]    Last Visit: VV 6/9/20  MD Carey  Next Appointment: Not scheduled- due 6/2021  Previous Refill Encounter(s): 9/1/20 #30    Requested Prescriptions     Pending Prescriptions Disp Refills    sertraline (ZOLOFT) 50 mg tablet 90 Tab 2     Sig: Take 1 Tab by mouth daily.

## 2020-09-26 RX ORDER — SERTRALINE HYDROCHLORIDE 50 MG/1
50 TABLET, FILM COATED ORAL DAILY
Qty: 90 TAB | Refills: 2 | Status: SHIPPED | OUTPATIENT
Start: 2020-09-26 | End: 2021-01-07 | Stop reason: SDUPTHER

## 2020-11-20 ENCOUNTER — TRANSCRIBE ORDER (OUTPATIENT)
Dept: GENERAL RADIOLOGY | Age: 40
End: 2020-11-20

## 2020-11-20 ENCOUNTER — HOSPITAL ENCOUNTER (OUTPATIENT)
Dept: MAMMOGRAPHY | Age: 40
Discharge: HOME | End: 2020-11-20

## 2020-11-20 DIAGNOSIS — Z12.31 VISIT FOR SCREENING MAMMOGRAM: ICD-10-CM

## 2020-11-20 DIAGNOSIS — Z12.31 VISIT FOR SCREENING MAMMOGRAM: Primary | ICD-10-CM

## 2021-01-07 ENCOUNTER — OFFICE VISIT (OUTPATIENT)
Dept: FAMILY MEDICINE CLINIC | Age: 41
End: 2021-01-07
Payer: COMMERCIAL

## 2021-01-07 ENCOUNTER — HOSPITAL ENCOUNTER (OUTPATIENT)
Dept: MAMMOGRAPHY | Age: 41
Discharge: HOME | End: 2021-01-07
Attending: FAMILY MEDICINE
Payer: COMMERCIAL

## 2021-01-07 VITALS
HEIGHT: 66 IN | DIASTOLIC BLOOD PRESSURE: 75 MMHG | WEIGHT: 153 LBS | OXYGEN SATURATION: 96 % | BODY MASS INDEX: 24.59 KG/M2 | HEART RATE: 63 BPM | SYSTOLIC BLOOD PRESSURE: 123 MMHG | TEMPERATURE: 96.1 F

## 2021-01-07 DIAGNOSIS — G47.00 INSOMNIA, UNSPECIFIED TYPE: ICD-10-CM

## 2021-01-07 DIAGNOSIS — N92.0 MENORRHAGIA WITH REGULAR CYCLE: ICD-10-CM

## 2021-01-07 DIAGNOSIS — Z12.31 VISIT FOR SCREENING MAMMOGRAM: ICD-10-CM

## 2021-01-07 DIAGNOSIS — R53.83 FATIGUE, UNSPECIFIED TYPE: ICD-10-CM

## 2021-01-07 DIAGNOSIS — F32.1 CURRENT MODERATE EPISODE OF MAJOR DEPRESSIVE DISORDER WITHOUT PRIOR EPISODE (HCC): ICD-10-CM

## 2021-01-07 DIAGNOSIS — Z00.00 ENCOUNTER FOR PREVENTIVE CARE: Primary | ICD-10-CM

## 2021-01-07 PROCEDURE — 99396 PREV VISIT EST AGE 40-64: CPT | Performed by: FAMILY MEDICINE

## 2021-01-07 PROCEDURE — 77067 SCR MAMMO BI INCL CAD: CPT

## 2021-01-07 RX ORDER — TRAZODONE HYDROCHLORIDE 50 MG/1
50 TABLET ORAL
Qty: 90 TAB | Refills: 1 | Status: SHIPPED | OUTPATIENT
Start: 2021-01-07 | End: 2021-11-12 | Stop reason: ALTCHOICE

## 2021-01-07 RX ORDER — BUPROPION HYDROCHLORIDE 150 MG/1
150 TABLET ORAL
Qty: 90 TAB | Refills: 1 | Status: SHIPPED | OUTPATIENT
Start: 2021-01-07 | End: 2021-11-12 | Stop reason: ALTCHOICE

## 2021-01-07 RX ORDER — SERTRALINE HYDROCHLORIDE 50 MG/1
50 TABLET, FILM COATED ORAL DAILY
Qty: 30 TAB | Refills: 0 | Status: SHIPPED | OUTPATIENT
Start: 2021-01-07 | End: 2021-10-20 | Stop reason: SDUPTHER

## 2021-01-07 RX ORDER — SERTRALINE HYDROCHLORIDE 50 MG/1
50 TABLET, FILM COATED ORAL DAILY
Qty: 90 TAB | Refills: 2 | Status: SHIPPED | OUTPATIENT
Start: 2021-01-07 | End: 2021-01-07 | Stop reason: SDUPTHER

## 2021-01-07 NOTE — PROGRESS NOTES
Farooq Rubio [facility]  [place]. [place], [address]  [phone]             Date of visit: 1/7/2021   Subjective:      History obtained from:  the patient. Ambrocio Hill is a 38 y.o. female who presents today for   Chief Complaint   Patient presents with    Medication Evaluation     Also due for preventive care    With the zoloft she doesn't have the physically racing heart  Has been a rough year; been at home with the kids and not exercising   got an elliptical and she is going to try to use that    Doesn't feel sad, just not very motivated  Not that hopeful about life  Home with 3 boys trying to do virtual school, that is hard  Both of her parents with recurrent cancer    Sleeping has not been great  Used an old trazodone rx, 50mg helped, used it prn  Has trouble staying asleep  Not waking up anxious (doesn't have the physical signs of anxiety she used to have but wakes up and mind going)    Has done counseling in the past, not sure she has the time now  Has supportive friends    Heavy periods still a problem  But is used to it  For now just managing it  Didn't like how she felt on birth control  Was not anemic when we checked last fall and says they are not worse  Not lasting long    Labs ok last year    Going to get derm check up because dad with melanoma    Patient Active Problem List    Diagnosis Date Noted    Menorrhagia with regular cycle 07/18/2019    Current moderate episode of major depressive disorder without prior episode ([facility] [address] 07/18/2019    Family history of colonic polyps 07/18/2019    Insomnia 04/05/2017    Anxiety 04/05/2017    History of PCOS 07/10/2012     Current Outpatient Medications   Medication Sig Dispense Refill    traZODone (DESYREL) 50 mg tablet Take 1 Tab by mouth nightly as needed for Sleep. 90 Tab 1    buPROPion XL (WELLBUTRIN XL) 150 mg tablet Take 1 Tab by mouth every morning.  90 Tab 1    sertraline (ZOLOFT) 50 mg tablet Take 1 Tab by mouth daily. 30 Tab 0     No Known Allergies  Past Medical History:   Diagnosis Date    Current moderate episode of major depressive disorder without prior episode ([facility] [address] 7/18/2019    Essential hypertension      1 st pregnancy few high blood pressure, 24 hour urine normal - few days before delivery    H/O maternal fourth degree perineal laceration, currently pregnant 10/7/2012    History of PCOS     Infertility, female     clomid pregnancy    Polycystic disease, ovaries     previously on metformin    Postpartum depression     saw a counselor     Past Surgical History:   Procedure Laterality Date    HX GYN      fourth degree laceration     Family History   Problem Relation Age of Onset    Hypertension Father     Heart Disease Father     Diabetes Father         Insluin dependendent (early onset)    Melanoma Father     Colon Cancer Maternal Grandmother [de-identified]    Cancer Maternal Grandfather 58        Melanoma    Cancer Mother         endometrial    Colon Polyps Mother     Colon Polyps Brother      Social History     Tobacco Use    Smoking status: Never Smoker    Smokeless tobacco: Never Used   Substance Use Topics    Alcohol use: Yes     Alcohol/week: 1.0 standard drinks     Types: 1 Glasses of wine per week     Comment: socially       Social History     Social History Narrative     with 3 kids    Stay at home mom, 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      General: stated age, well-developed, and in NAD  Eyes: PERRL, EOMI, no redness or drainage  Nose: no drainage  Mouth: no lesions  Throat: no erythema, exudate or swelling  Neck: supple, symmetrical, trachea midline, no adenopathy and thyroid: not enlarged, symmetric, no tenderness/mass/nodules  Lungs:  clear to auscultation w/o rales, rhonchi, wheezes w/normal effort and no use of accessory muscles of respiration   Heart: regular rate and rhythm, S1, S2 normal, no murmur, click, rub or gallop  Abdomen: soft, nontender, no masses  Ext:  No edema noted. Lymph: no cervical adenopathy appreciated  Skin:  Normal. and no rash or abnormalities   Neuro: normal gait, CN 2-12 intact  Psych: alert and oriented to person, place, time and situation and Speech: appropriate quality, quantity and organization of sentences    Assessment/Plan:       ICD-10-CM ICD-9-CM    1. Encounter for preventive care  W72.64 N59.5 METABOLIC PANEL, COMPREHENSIVE      CBC WITH AUTOMATED DIFF      IRON PROFILE      FERRITIN      FERRITIN      IRON PROFILE      CBC WITH AUTOMATED DIFF      METABOLIC PANEL, COMPREHENSIVE   2. Menorrhagia with regular cycle  N92.0 626.2 CBC WITH AUTOMATED DIFF      IRON PROFILE      FERRITIN      FERRITIN      IRON PROFILE      CBC WITH AUTOMATED DIFF   3. Current moderate episode of major depressive disorder without prior episode (HCC)  F32.1 296.22    4. Insomnia, unspecified type  G47.00 780.52    5.  Fatigue, unspecified type  C98.99 929.97 METABOLIC PANEL, COMPREHENSIVE      METABOLIC PANEL, COMPREHENSIVE        Preventive up to date  Periods tolerable to her but need to see if anemic since she does feel fatigued  Seems the zoloft has worked well for anxiety symptoms but not controlling her depression  I suspect exercise might be enough but she is having a really hard time getting motivated  Add wellbutrin  Continue prn trazodone for sleep  Update me next week on portal   and friends supportive  Has done counseling in the past; just not feeling like she can do it now    Discussed the diagnosis and plan and she expressed understanding. Follow-up and Dispositions    · Return in about 3 months (around 4/7/2021) for Follow up.          Bogdan Lamas MD

## 2021-01-07 NOTE — PATIENT INSTRUCTIONS
Recovering From Depression: Care Instructions  Your Care Instructions     Taking good care of yourself is important as you recover from depression. In time, your symptoms will fade as your treatment takes hold. Do not give up. Instead, focus your energy on getting better. Your mood will improve. It just takes some time. Focus on things that can help you feel better, such as being with friends and family, eating well, and getting enough rest. But take things slowly. Do not do too much too soon. You will begin to feel better gradually. Follow-up care is a key part of your treatment and safety. Be sure to make and go to all appointments, and call your doctor if you are having problems. It's also a good idea to know your test results and keep a list of the medicines you take. How can you care for yourself at home? Be realistic  · If you have a large task to do, break it up into smaller steps you can handle, and just do what you can. · You may want to put off important decisions until your depression has lifted. If you have plans that will have a major impact on your life, such as marriage, divorce, or a job change, try to wait a bit. Talk it over with friends and loved ones who can help you look at the overall picture first.  · Reaching out to people for help is important. Do not isolate yourself. Let your family and friends help you. Find someone you can trust and confide in, and talk to that person. · Be patient, and be kind to yourself. Remember that depression is not your fault and is not something you can overcome with willpower alone. Treatment is important for depression, just like for any other illness. Feeling better takes time, and your mood will improve little by little. Stay active  · Stay busy and get outside. Take a walk, or try some other light exercise. · Talk with your doctor about an exercise program. Exercise can help with mild depression. · Go to a movie or concert.  Take part in a Rastafarian activity or other social gathering. Go to a easyfolio game. · Ask a friend to have dinner with you. Take care of yourself  · Eat a balanced diet with plenty of fresh fruits and vegetables, whole grains, and lean protein. If you have lost your appetite, eat small snacks rather than large meals. · Avoid using illegal drugs or marijuana and drinking alcohol. Do not take medicines that have not been prescribed for you. They may interfere with medicines you may be taking for depression, or they may make your depression worse. · Take your medicines exactly as they are prescribed. You may start to feel better within 1 to 3 weeks of taking antidepressant medicine. But it can take as many as 6 to 8 weeks to see more improvement. If you have questions or concerns about your medicines, or if you do not notice any improvement by 3 weeks, talk to your doctor. · Continue to take your medicine after your symptoms improve. Taking your medicine for at least 6 months after you feel better can help keep you from getting depressed again. If this isn't the first time you have been depressed, your doctor may recommend you to take medicine even longer. · If you have any side effects from your medicine, tell your doctor. Many side effects are mild and will go away on their own after you have been taking the medicine for a few weeks. Some may last longer. Talk to your doctor if side effects are bothering you too much. You might be able to try a different medicine. · Continue counseling. It may help prevent depression from returning, especially if you've had multiple episodes of depression. Talk with your counselor if you are having a hard time attending your sessions or you think the sessions aren't working. Don't just stop going. · Get enough sleep. Talk to your doctor if you are having problems sleeping. · Avoid sleeping pills unless they are prescribed by the doctor treating your depression.  Sleeping pills may make you groggy during the day, and they may interact with other medicine you are taking. · If you have any other illnesses, such as diabetes, heart disease, or high blood pressure, make sure to continue with your treatment. Tell your doctor about all of the medicines you take, including those with or without a prescription. · If you or someone you know talks about suicide, self-harm, or feeling hopeless, get help right away. Call the [address] [place] [address] at 1-800-273-talk (0-791.421.7977) or text HOME to 958856 to access the Crisis Text Line. Consider saving these numbers in your phone. When should you call for help? Call 911 anytime you think you may need emergency care. For example, call if:    · You feel like hurting yourself or someone else.     · Someone you know has depression and is about to attempt or is attempting suicide. Call your doctor now or seek immediate medical care if:    · You hear voices.     · Someone you know has depression and:  ? Starts to give away his or her possessions. ? Uses illegal drugs or drinks alcohol heavily. ? Talks or writes about death, including writing suicide notes or talking about guns, knives, or pills. ? Starts to spend a lot of time alone. ? Acts very aggressively or suddenly appears calm. Watch closely for changes in your health, and be sure to contact your doctor if:    · You do not get better as expected. Where can you learn more? Go to http://www.gray.com/  Enter N529 in the search box to learn more about \"Recovering From Depression: Care Instructions. \"  Current as of: January 31, 2020               Content Version: 12.6  © 1587-2364 [employer], Incorporated. Care instructions adapted under license by [employer] (which disclaims liability or warranty for this information).  If you have questions about a medical condition or this instruction, always ask your healthcare professional. Zulay Motta disclaims any warranty or liability for your use of this information. Well Visit, Ages 25 to 48: Care Instructions  Your Care Instructions     Physical exams can help you stay healthy. Your doctor has checked your overall health and may have suggested ways to take good care of yourself. He or she also may have recommended tests. At home, you can help prevent illness with healthy eating, regular exercise, and other steps. Follow-up care is a key part of your treatment and safety. Be sure to make and go to all appointments, and call your doctor if you are having problems. It's also a good idea to know your test results and keep a list of the medicines you take. How can you care for yourself at home? · Reach and stay at a healthy weight. This will lower your risk for many problems, such as obesity, diabetes, heart disease, and high blood pressure. · Get at least 30 minutes of physical activity on most days of the week. Walking is a good choice. You also may want to do other activities, such as running, swimming, cycling, or playing tennis or team sports. Discuss any changes in your exercise program with your doctor. · Do not smoke or allow others to smoke around you. If you need help quitting, talk to your doctor about stop-smoking programs and medicines. These can increase your chances of quitting for good. · Talk to your doctor about whether you have any risk factors for sexually transmitted infections (STIs). Having one sex partner (who does not have STIs and does not have sex with anyone else) is a good way to avoid these infections. · Use birth control if you do not want to have children at this time. Talk with your doctor about the choices available and what might be best for you. · Protect your skin from too much sun. When you're outdoors from 10 a.m. to 4 p.m., stay in the shade or cover up with clothing and a hat with a wide brim. Wear sunglasses that block UV rays.  Even when it's cloudy, put broad-spectrum sunscreen (SPF 30 or higher) on any exposed skin. · See a dentist one or two times a year for checkups and to have your teeth cleaned. · Wear a seat belt in the car. Follow your doctor's advice about when to have certain tests. These tests can spot problems early. For everyone  · Cholesterol. Have the fat (cholesterol) in your blood tested after age 21. Your doctor will tell you how often to have this done based on your age, family history, or other things that can increase your risk for heart disease. · Blood pressure. Have your blood pressure checked during a routine doctor visit. Your doctor will tell you how often to check your blood pressure based on your age, your blood pressure results, and other factors. · Vision. Talk with your doctor about how often to have a glaucoma test.  · Diabetes. Ask your doctor whether you should have tests for diabetes. · Colon cancer. Your risk for colorectal cancer gets higher as you get older. Some experts say that adults should start regular screening at age 48 and stop at age 76. Others say to start before age 48 or continue after age 76. Talk with your doctor about your risk and when to start and stop screening. For women  · Breast exam and mammogram. Talk to your doctor about when you should have a clinical breast exam and a mammogram. Medical experts differ on whether and how often women under 50 should have these tests. Your doctor can help you decide what is right for you. · Cervical cancer screening test and pelvic exam. Begin with a Pap test at age 24. The test often is part of a pelvic exam. Starting at age 27, you may choose to have a Pap test, an HPV test, or both tests at the same time (called co-testing). Talk with your doctor about how often to have testing. · Tests for sexually transmitted infections (STIs). Ask whether you should have tests for STIs.  You may be at risk if you have sex with more than one person, especially if your partners do not wear condoms. For men  · Tests for sexually transmitted infections (STIs). Ask whether you should have tests for STIs. You may be at risk if you have sex with more than one person, especially if you do not wear a condom. · Testicular cancer exam. Ask your doctor whether you should check your testicles regularly. · Prostate exam. Talk to your doctor about whether you should have a blood test (called a PSA test) for prostate cancer. Experts differ on whether and when men should have this test. Some experts suggest it if you are older than 39 and are -American or have a father or brother who got prostate cancer when he was younger than 72. When should you call for help? Watch closely for changes in your health, and be sure to contact your doctor if you have any problems or symptoms that concern you. Where can you learn more? Go to http://www.rodriguez.com/  Enter P072 in the search box to learn more about \"Well Visit, Ages 25 to 48: Care Instructions. \"  Current as of: May 27, 2020               Content Version: 12.6  © 1419-3253 [employer] Incorporated. Care instructions adapted under license by [employer] (which disclaims liability or warranty for this information). If you have questions about a medical condition or this instruction, always ask your healthcare professional. [address] any warranty or liability for your use of this information.

## 2021-01-07 NOTE — PROGRESS NOTES
Room: 39    Identified pt with two pt identifiers(name and ). Reviewed record in preparation for visit and have obtained necessary documentation. All patient medications has been reviewed. Chief Complaint   Patient presents with    Medication Evaluation       There are no preventive care reminders to display for this patient. Vitals:    21 1455   BP: 123/75   Pulse: 63   Temp: (!) 96.1 °F (35.6 °C)   TempSrc: Temporal   SpO2: 96%   Weight: 153 lb (69.4 kg)   Height: 5' 6\" (1.676 m)   PainSc:   0 - No pain       4. Have you been to the ER, urgent care clinic since your last visit? Hospitalized since your last visit? No    5. Have you seen or consulted any other health care providers outside of the [address] [place] [address] since your last visit? Include any pap smears or colon screening. No    Patient is accompanied by self I have received verbal consent from Kathe Bean to discuss any/all medical information while they are present in the room.

## 2021-01-08 LAB
ALBUMIN SERPL-MCNC: 4.3 G/DL (ref 3.5–5)
ALBUMIN/GLOB SERPL: 1.3 {RATIO} (ref 1.1–2.2)
ALP SERPL-CCNC: 76 U/L (ref 45–117)
ALT SERPL-CCNC: 34 U/L (ref 12–78)
ANION GAP SERPL CALC-SCNC: 4 MMOL/L (ref 5–15)
AST SERPL-CCNC: 18 U/L (ref 15–37)
BASOPHILS # BLD: 0 K/UL (ref 0–0.1)
BASOPHILS NFR BLD: 0 % (ref 0–1)
BILIRUB SERPL-MCNC: 0.2 MG/DL (ref 0.2–1)
BUN SERPL-MCNC: 17 MG/DL (ref 6–20)
BUN/CREAT SERPL: 22 (ref 12–20)
CALCIUM SERPL-MCNC: 9.1 MG/DL (ref 8.5–10.1)
CHLORIDE SERPL-SCNC: 104 MMOL/L (ref 97–108)
CO2 SERPL-SCNC: 31 MMOL/L (ref 21–32)
CREAT SERPL-MCNC: 0.77 MG/DL (ref 0.55–1.02)
DIFFERENTIAL METHOD BLD: ABNORMAL
EOSINOPHIL # BLD: 0.1 K/UL (ref 0–0.4)
EOSINOPHIL NFR BLD: 1 % (ref 0–7)
ERYTHROCYTE [DISTWIDTH] IN BLOOD BY AUTOMATED COUNT: 16.1 % (ref 11.5–14.5)
FERRITIN SERPL-MCNC: 7 NG/ML (ref 26–388)
GLOBULIN SER CALC-MCNC: 3.2 G/DL (ref 2–4)
GLUCOSE SERPL-MCNC: 90 MG/DL (ref 65–100)
HCT VFR BLD AUTO: 38.7 % (ref 35–47)
HGB BLD-MCNC: 11.6 G/DL (ref 11.5–16)
IMM GRANULOCYTES # BLD AUTO: 0 K/UL (ref 0–0.04)
IMM GRANULOCYTES NFR BLD AUTO: 0 % (ref 0–0.5)
IRON SATN MFR SERPL: 6 % (ref 20–50)
IRON SERPL-MCNC: 27 UG/DL (ref 35–150)
LYMPHOCYTES # BLD: 2 K/UL (ref 0.8–3.5)
LYMPHOCYTES NFR BLD: 26 % (ref 12–49)
MCH RBC QN AUTO: 23.8 PG (ref 26–34)
MCHC RBC AUTO-ENTMCNC: 30 G/DL (ref 30–36.5)
MCV RBC AUTO: 79.3 FL (ref 80–99)
MONOCYTES # BLD: 0.4 K/UL (ref 0–1)
MONOCYTES NFR BLD: 6 % (ref 5–13)
NEUTS SEG # BLD: 5.2 K/UL (ref 1.8–8)
NEUTS SEG NFR BLD: 67 % (ref 32–75)
NRBC # BLD: 0 K/UL (ref 0–0.01)
NRBC BLD-RTO: 0 PER 100 WBC
PLATELET # BLD AUTO: 267 K/UL (ref 150–400)
PMV BLD AUTO: 11.9 FL (ref 8.9–12.9)
POTASSIUM SERPL-SCNC: 3.8 MMOL/L (ref 3.5–5.1)
PROT SERPL-MCNC: 7.5 G/DL (ref 6.4–8.2)
RBC # BLD AUTO: 4.88 M/UL (ref 3.8–5.2)
SODIUM SERPL-SCNC: 139 MMOL/L (ref 136–145)
TIBC SERPL-MCNC: 466 UG/DL (ref 250–450)
WBC # BLD AUTO: 7.7 K/UL (ref 3.6–11)

## 2021-01-09 PROBLEM — E61.1 IRON DEFICIENCY: Status: ACTIVE | Noted: 2021-01-09

## 2021-01-09 RX ORDER — FERROUS SULFATE 325(65) MG
325 TABLET, DELAYED RELEASE (ENTERIC COATED) ORAL
Qty: 90 TAB | Refills: 3 | Status: SHIPPED | OUTPATIENT
Start: 2021-01-09 | End: 2021-11-12 | Stop reason: ALTCHOICE

## 2021-01-09 RX ORDER — FERROUS SULFATE 325(65) MG
325 TABLET, DELAYED RELEASE (ENTERIC COATED) ORAL
Qty: 90 TAB | Refills: 3 | Status: SHIPPED | OUTPATIENT
Start: 2021-01-09 | End: 2021-01-09 | Stop reason: SDUPTHER

## 2021-03-02 ENCOUNTER — OFFICE VISIT (OUTPATIENT)
Dept: OBGYN CLINIC | Age: 41
End: 2021-03-02
Payer: COMMERCIAL

## 2021-03-02 VITALS — DIASTOLIC BLOOD PRESSURE: 90 MMHG | BODY MASS INDEX: 24.69 KG/M2 | WEIGHT: 153 LBS | SYSTOLIC BLOOD PRESSURE: 122 MMHG

## 2021-03-02 DIAGNOSIS — Z30.430 VISIT FOR INSERTION OF INTRAUTERINE DEVICE: Primary | ICD-10-CM

## 2021-03-02 DIAGNOSIS — N92.0 MENORRHAGIA WITH REGULAR CYCLE: ICD-10-CM

## 2021-03-02 LAB
HCG URINE, QL. (POC): NEGATIVE
VALID INTERNAL CONTROL?: YES

## 2021-03-02 PROCEDURE — 58300 INSERT INTRAUTERINE DEVICE: CPT | Performed by: ADVANCED PRACTICE MIDWIFE

## 2021-03-02 PROCEDURE — 81025 URINE PREGNANCY TEST: CPT | Performed by: ADVANCED PRACTICE MIDWIFE

## 2021-03-02 PROCEDURE — 99213 OFFICE O/P EST LOW 20 MIN: CPT | Performed by: ADVANCED PRACTICE MIDWIFE

## 2021-03-02 NOTE — PROGRESS NOTES
Alaina Wilson is a 38 y.o. female who complains of heavy bleeding during periods and has had low ferritin levels in 1/2021. Her current method of family planning is condoms most of the time. The patient is sexually active. She developed this problem approximately 3 years ago. Her relevant past medical history:   Past Medical History:   Diagnosis Date    Current moderate episode of major depressive disorder without prior episode ([facility] [address] 7/18/2019    Essential hypertension      1 st pregnancy few high blood pressure, 24 hour urine normal - few days before delivery    H/O maternal fourth degree perineal laceration, currently pregnant 10/7/2012    History of PCOS     Infertility, female     clomid pregnancy    Polycystic disease, ovaries     previously on metformin    Postpartum depression     saw a counselor        Past Surgical History:   Procedure Laterality Date    HX GYN      fourth degree laceration     Social History     Occupational History    Occupation:      Employer: [employer]   Tobacco Use    Smoking status: Never Smoker    Smokeless tobacco: Never Used   Substance and Sexual Activity    Alcohol use: Yes     Alcohol/week: 1.0 standard drinks     Types: 1 Glasses of wine per week     Comment: socially     Drug use: No    Sexual activity: Yes     Partners: Male     Birth control/protection: None, Condom     Family History   Problem Relation Age of Onset    Hypertension Father     Heart Disease Father     Diabetes Father         [place] dependendent (early onset)    Melanoma Father     Colon Cancer Maternal Grandmother [de-identified]    Cancer Maternal Grandfather 58        Melanoma    Cancer Mother         endometrial    Colon Polyps Mother     Colon Polyps Brother        No Known Allergies  Prior to Admission medications    Medication Sig Start Date End Date Taking? Authorizing Provider   BIOTIN PO Take  by mouth.    Yes Provider, Historical   ferrous sulfate (IRON) 325 mg (65 mg iron) EC tablet Take 1 Tab by mouth daily (after breakfast). 1/9/21  Yes Bisi Carey MD   traZODone (DESYREL) 50 mg tablet Take 1 Tab by mouth nightly as needed for Sleep. 1/7/21  Yes Bisi Carey MD   sertraline (ZOLOFT) 50 mg tablet Take 1 Tab by mouth daily. 1/7/21  Yes Bisi Carey MD   buPROPion XL (WELLBUTRIN XL) 150 mg tablet Take 1 Tab by mouth every morning.  1/7/21   Bisi Carey MD        Review of Systems - History obtained from the patient  Constitutional: negative for weight loss, fever, night sweats  HEENT: negative for hearing loss, earache, congestion, snoring, sorethroat  CV: negative for chest pain, palpitations, edema  Resp: negative for cough, shortness of breath, wheezing  Breast: negative for breast lumps, nipple discharge, galactorrhea  GI: negative for change in bowel habits, abdominal pain, black or bloody stools  : negative for frequency, dysuria, hematuria  MSK: negative for back pain, joint pain, muscle pain  Skin: negative for itching, rash, hives  Neuro: negative for dizziness, headache, confusion, weakness  Psych: negative for anxiety, depression, change in mood  Heme/lymph: negative for bleeding, bruising, pallor      Objective:  Visit Vitals  Wt 153 lb (69.4 kg)   LMP 02/28/2021   BMI 24.69 kg/m²          PHYSICAL EXAMINATION    Constitutional  · Appearance: well-nourished, well developed, alert, in no acute distress    HENT  · Head and Face: appears normal    Neck  · Inspection/Palpation: normal appearance, no masses or tenderness  · Lymph Nodes: no lymphadenopathy present  · Thyroid: gland size normal, nontender, no nodules or masses present on palpation  ·   Breasts  · Inspection of Breasts: breasts symmetrical, no skin changes, no discharge present, nipple appearance normal, no skin retraction present  · Palpation of Breasts and Axillae: no masses present on palpation, no breast tenderness  · Axillary Lymph Nodes: no lymphadenopathy present    Gastrointestinal  · Abdominal Examination: abdomen non-tender to palpation, no masses present  · Liver and spleen: no hepatomegaly present, spleen not palpable  · Hernias: no hernias identified    Genitourinary  · External Genitalia: normal appearance for age, no discharge present, no tenderness present, no inflammatory lesions present, no masses present, no atrophy present  · Vagina: normal vaginal vault without central or paravaginal defects, no discharge present, no inflammatory lesions present, no masses present  · Bladder: non-tender to palpation  · Urethra: appears normal  · Cervix: normal   · Uterus: normal size, shape and consistency  · Adnexa: no adnexal tenderness present, no adnexal masses present  · Perineum: perineum within normal limits, no evidence of trauma, no rashes or skin lesions present  · Anus: anus within normal limits, no hemorrhoids present  · Inguinal Lymph Nodes: no lymphadenopathy present    Skin  · General Inspection: no rash, no lesions identified    Neurologic/Psychiatric  · Mental Status:  · Orientation: grossly oriented to person, place and time  · Mood and Affect: mood normal, affect appropriate         IUD INSERTION  Indications:  Jayleen Arguello is a ,  38 y.o. female WHITE Patient's last menstrual period was 2021. Her LMP was normal in duration and amount of flow. She  presents for insertion of an IUD. The risks, benefits and alternatives of IUD insertion were discussed in detail at last visit. She also has reviewed appropriate IUD information. She has elected to proceed with the insertion today and she states she has no further questions. A urine pregnancy test was negative No components found for: SPEP, UPEP  Procedure: The pelvic exam revealed normal external genitalia. On bimanual exam the uterus was anteverted and normal in size with no tenderness present.  A speculum was inserted into the vagina and the cervix was visualized. The cervix was prepped with a betadine solution. The anterior lip of the cervix was grasped with an Allis tenaculum. The uterus was sounded with a Carlin sound to 7 centimeters. A  IUD was then inserted without difficulty. The string was cut to 3 centimeters. She experienced a minimal amount of cramping. Post Procedure Status: The patient was observed for 5 minutes after the insertion. There were no complications. Patient was given postop instructions and instructed to check on IUD string in 1 to 2 months or to have IUD check here in the office. Patient was discharged in stable condition. Assessment/Plan:   Menorrhagia   Discussion on LARCs  On cycle today - desires Mirena - reviewed R/B/placement - irreg bleeding post placement discussed     Buck Conti CNM      Plan:     IUD placed discussed R/B/placement   Mirena placed   Patient declines presence of chaperone during today's visit.         Buck Conti CNM

## 2021-03-02 NOTE — PATIENT INSTRUCTIONS
Abnormal Uterine Bleeding: Care Instructions  Your Care Instructions     Abnormal uterine bleeding is irregular bleeding from the uterus that is longer or heavier than usual or does not occur at your regular time. Sometimes it is caused by changes in hormone levels. It can also be caused by growths in the uterus, such as fibroids or polyps. Sometimes a cause cannot be found. You may have heavy bleeding when you are not expecting your period. Your doctor may suggest a pregnancy test, if you think you are pregnant. Follow-up care is a key part of your treatment and safety. Be sure to make and go to all appointments, and call your doctor if you are having problems. It's also a good idea to know your test results and keep a list of the medicines you take. How can you care for yourself at home? · Be safe with medicines. Take pain medicines exactly as directed. ? If the doctor gave you a prescription medicine for pain, take it as prescribed. ? If you are not taking a prescription pain medicine, ask your doctor if you can take an over-the-counter medicine. · You may be low in iron because of blood loss. Eat a balanced diet that is high in iron and vitamin C. Foods rich in iron include red meat, shellfish, eggs, beans, and leafy green vegetables. Talk to your doctor about whether you need to take iron pills or a multivitamin. When should you call for help? Call 911 anytime you think you may need emergency care. For example, call if:    · You passed out (lost consciousness). Call your doctor now or seek immediate medical care if:    · You have new or worse belly or pelvic pain.     · You have severe vaginal bleeding.     · You feel dizzy or lightheaded, or you feel like you may faint. Watch closely for changes in your health, and be sure to contact your doctor if:    · You think you may be pregnant.     · Your bleeding gets worse.     · You do not get better as expected. Where can you learn more?   Go to http://www.gray.com/  Enter [id number] in the search box to learn more about \"Abnormal Uterine Bleeding: Care Instructions. \"  Current as of: November 8, 2019               Content Version: 12.6  © 1110-9993 [employer]. Care instructions adapted under license by [employer] (which disclaims liability or warranty for this information). If you have questions about a medical condition or this instruction, always ask your healthcare professional. [address] any warranty or liability for your use of this information.

## 2021-06-10 ENCOUNTER — OFFICE VISIT (OUTPATIENT)
Dept: URGENT CARE | Age: 41
End: 2021-06-10
Payer: COMMERCIAL

## 2021-06-10 VITALS — TEMPERATURE: 98.2 F | OXYGEN SATURATION: 98 % | HEART RATE: 91 BPM | RESPIRATION RATE: 16 BRPM

## 2021-06-10 DIAGNOSIS — J02.9 SORE THROAT: Primary | ICD-10-CM

## 2021-06-10 DIAGNOSIS — Z20.818 EXPOSURE TO STREP THROAT: ICD-10-CM

## 2021-06-10 LAB
S PYO AG THROAT QL: NEGATIVE
VALID INTERNAL CONTROL?: YES

## 2021-06-10 PROCEDURE — S9083 URGENT CARE CENTER GLOBAL: HCPCS | Performed by: FAMILY MEDICINE

## 2021-06-10 PROCEDURE — 87880 STREP A ASSAY W/OPTIC: CPT | Performed by: FAMILY MEDICINE

## 2021-06-10 RX ORDER — AMOXICILLIN 875 MG/1
875 TABLET, FILM COATED ORAL 2 TIMES DAILY
Qty: 20 TABLET | Refills: 0 | Status: SHIPPED | OUTPATIENT
Start: 2021-06-10 | End: 2021-06-20

## 2021-06-10 NOTE — PROGRESS NOTES
This patient was seen at [address] [place] [address] Urgent Care while in their vehicle due to COVID-19 pandemic with PPE and focused examination in order to decrease community viral transmission. The patient/guardian gave verbal consent to treat. Holly Flores is a 38 y.o. female who presents with ST x 4-5 days along with congestion. Son was dx'ed with strep today; also he tested negative to COVID. Denies cough, fever, SOB. The history is provided by the patient. Past Medical History:   Diagnosis Date    Current moderate episode of major depressive disorder without prior episode ([facility] [address] 7/18/2019    Essential hypertension      1 st pregnancy few high blood pressure, 24 hour urine normal - few days before delivery    H/O maternal fourth degree perineal laceration, currently pregnant 10/7/2012    History of PCOS     Infertility, female     clomid pregnancy    Polycystic disease, ovaries     previously on metformin    Postpartum depression     saw a counselor        Past Surgical History:   Procedure Laterality Date    HX GYN      fourth degree laceration         Family History   Problem Relation Age of Onset    Hypertension Father     Heart Disease Father     Diabetes Father         Insluin dependendent (early onset)    Melanoma Father     Colon Cancer Maternal Grandmother [de-identified]    Cancer Maternal Grandfather 58        Melanoma    Cancer Mother         endometrial    Colon Polyps Mother     Colon Polyps Brother         Social History     Socioeconomic History    Marital status:      Spouse name: Yuriy Parson    Number of children: 0    Years of education: Not on file    Highest education level: Not on file   Occupational History    Occupation:      Employer: [employer]   Tobacco Use    Smoking status: Never Smoker    Smokeless tobacco: Never Used   Vaping Use    Vaping Use: Never used   Substance and Sexual Activity    Alcohol use:  Yes     Alcohol/week: 1.0 standard drinks     Types: 1 Glasses of wine per week     Comment: socially     Drug use: No    Sexual activity: Yes     Partners: Male     Birth control/protection: None, Condom   Other Topics Concern    Not on file   Social History Narrative     with 3 kids    Stay at home mom, 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, trained at [employer]     Social Determinants of Health     Financial Resource Strain:     Difficulty of Paying Living Expenses:    Food Insecurity:     Worried About [address] in the Last Year:     [address] in the Last Year:    Transportation Needs:     Lack of Transportation (Medical):  Lack of Transportation (Non-Medical):    Physical Activity:     Days of Exercise per Week:     Minutes of Exercise per Session:    Stress:     Feeling of Stress :    Social Connections:     Frequency of Communication with Friends and Family:     Frequency of Social Gatherings with Friends and Family:     Attends Druze Services:     Active Member of Clubs or Organizations:     Attends Club or Organization Meetings:     Marital Status:    Intimate Partner Violence:     Fear of Current or Ex-Partner:     Emotionally Abused:     Physically Abused:     Sexually Abused: ALLERGIES: Patient has no known allergies. Review of Systems   Constitutional: Negative for fever. HENT: Positive for congestion and sore throat. Respiratory: Negative for cough and shortness of breath. Vitals:    06/10/21 1230   Pulse: 91   Resp: 16   Temp: 98.2 °F (36.8 °C)   SpO2: 98%       Physical Exam  Vitals and nursing note reviewed. Constitutional:       General: She is not in acute distress. Appearance: She is well-developed. She is not diaphoretic. HENT:      Mouth/Throat:      Pharynx: Oropharyngeal exudate (on right) and posterior oropharyngeal erythema present.    Pulmonary:      Effort: Pulmonary effort is normal. Lymphadenopathy:      Cervical: No cervical adenopathy. Neurological:      Mental Status: She is alert. Psychiatric:         Behavior: Behavior normal.         Thought Content: Thought content normal.         Judgment: Judgment normal.         MDM    ICD-10-CM ICD-9-CM   1. Sore throat  J02.9 462   2. Exposure to strep throat  Z20.818 V01.89       Orders Placed This Encounter    AMB POC RAPID STREP A    amoxicillin (AMOXIL) 875 mg tablet     Sig: Take 1 Tablet by mouth two (2) times a day for 10 days. Dispense:  20 Tablet     Refill:  0        Tylenol prn    If signs and symptoms become worse the pt is to go to the ER.      Results for orders placed or performed in visit on 06/10/21   AMB POC RAPID STREP A   Result Value Ref Range    VALID INTERNAL CONTROL POC Yes     Group A Strep Ag Negative Negative       Procedures

## 2021-10-20 NOTE — TELEPHONE ENCOUNTER
Patient called has appointment on 11/12/2021 would like enough medication till she sees provider.     Best call back #[phone]

## 2021-10-20 NOTE — TELEPHONE ENCOUNTER
Call from [place] [facility] for new rx for sertraline. MD Carey patient. LOV: 1/7/21. (has MD Malini Mccollum listed also on Care Team). Thanks, Holly Roque    Last Visit: 1/7/21 MD Carey  Next Appointment: Not scheduled- Needs new provider/Appt  Previous Refill Encounter(s): 1/7/21 30 (Class: Print)    Requested Prescriptions     Pending Prescriptions Disp Refills    sertraline (ZOLOFT) 50 mg tablet 90 Tablet 0     Sig: Take 1 Tablet by mouth daily.

## 2021-10-21 RX ORDER — SERTRALINE HYDROCHLORIDE 50 MG/1
50 TABLET, FILM COATED ORAL DAILY
Qty: 90 TABLET | Refills: 0 | Status: SHIPPED | OUTPATIENT
Start: 2021-10-21 | End: 2022-01-31 | Stop reason: SDUPTHER

## 2021-11-12 ENCOUNTER — OFFICE VISIT (OUTPATIENT)
Dept: FAMILY MEDICINE CLINIC | Age: 41
End: 2021-11-12
Payer: COMMERCIAL

## 2021-11-12 VITALS
RESPIRATION RATE: 18 BRPM | TEMPERATURE: 99.1 F | WEIGHT: 159.8 LBS | HEART RATE: 94 BPM | BODY MASS INDEX: 25.68 KG/M2 | SYSTOLIC BLOOD PRESSURE: 115 MMHG | DIASTOLIC BLOOD PRESSURE: 81 MMHG | OXYGEN SATURATION: 99 % | HEIGHT: 66 IN

## 2021-11-12 DIAGNOSIS — Z11.59 ENCOUNTER FOR HEPATITIS C SCREENING TEST FOR LOW RISK PATIENT: ICD-10-CM

## 2021-11-12 DIAGNOSIS — D50.9 IRON DEFICIENCY ANEMIA, UNSPECIFIED IRON DEFICIENCY ANEMIA TYPE: ICD-10-CM

## 2021-11-12 DIAGNOSIS — Z13.220 SCREENING, LIPID: ICD-10-CM

## 2021-11-12 DIAGNOSIS — F32.1 CURRENT MODERATE EPISODE OF MAJOR DEPRESSIVE DISORDER WITHOUT PRIOR EPISODE (HCC): ICD-10-CM

## 2021-11-12 DIAGNOSIS — Z00.00 ROUTINE GENERAL MEDICAL EXAMINATION AT A HEALTH CARE FACILITY: Primary | ICD-10-CM

## 2021-11-12 LAB
ALBUMIN SERPL-MCNC: 4.2 G/DL (ref 3.5–5)
ALBUMIN/GLOB SERPL: 1.4 {RATIO} (ref 1.1–2.2)
ALP SERPL-CCNC: 74 U/L (ref 45–117)
ALT SERPL-CCNC: 41 U/L (ref 12–78)
ANION GAP SERPL CALC-SCNC: 6 MMOL/L (ref 5–15)
AST SERPL-CCNC: 15 U/L (ref 15–37)
BASOPHILS # BLD: 0 K/UL (ref 0–0.1)
BASOPHILS NFR BLD: 1 % (ref 0–1)
BILIRUB SERPL-MCNC: 0.5 MG/DL (ref 0.2–1)
BUN SERPL-MCNC: 19 MG/DL (ref 6–20)
BUN/CREAT SERPL: 24 (ref 12–20)
CALCIUM SERPL-MCNC: 10 MG/DL (ref 8.5–10.1)
CHLORIDE SERPL-SCNC: 105 MMOL/L (ref 97–108)
CHOLEST SERPL-MCNC: 226 MG/DL
CO2 SERPL-SCNC: 27 MMOL/L (ref 21–32)
COMMENT, HOLDF: NORMAL
CREAT SERPL-MCNC: 0.79 MG/DL (ref 0.55–1.02)
DIFFERENTIAL METHOD BLD: NORMAL
EOSINOPHIL # BLD: 0 K/UL (ref 0–0.4)
EOSINOPHIL NFR BLD: 1 % (ref 0–7)
ERYTHROCYTE [DISTWIDTH] IN BLOOD BY AUTOMATED COUNT: 13 % (ref 11.5–14.5)
FERRITIN SERPL-MCNC: 41 NG/ML (ref 26–388)
GLOBULIN SER CALC-MCNC: 3.1 G/DL (ref 2–4)
GLUCOSE SERPL-MCNC: 97 MG/DL (ref 65–100)
HCT VFR BLD AUTO: 45.3 % (ref 35–47)
HCV AB SERPL QL IA: NONREACTIVE
HDLC SERPL-MCNC: 49 MG/DL
HDLC SERPL: 4.6 {RATIO} (ref 0–5)
HGB BLD-MCNC: 14.5 G/DL (ref 11.5–16)
IMM GRANULOCYTES # BLD AUTO: 0 K/UL (ref 0–0.04)
IMM GRANULOCYTES NFR BLD AUTO: 0 % (ref 0–0.5)
IRON SATN MFR SERPL: 31 % (ref 20–50)
IRON SERPL-MCNC: 113 UG/DL (ref 35–150)
LDLC SERPL CALC-MCNC: 137.8 MG/DL (ref 0–100)
LYMPHOCYTES # BLD: 1.6 K/UL (ref 0.8–3.5)
LYMPHOCYTES NFR BLD: 32 % (ref 12–49)
MCH RBC QN AUTO: 28.8 PG (ref 26–34)
MCHC RBC AUTO-ENTMCNC: 32 G/DL (ref 30–36.5)
MCV RBC AUTO: 90.1 FL (ref 80–99)
MONOCYTES # BLD: 0.3 K/UL (ref 0–1)
MONOCYTES NFR BLD: 5 % (ref 5–13)
NEUTS SEG # BLD: 3.1 K/UL (ref 1.8–8)
NEUTS SEG NFR BLD: 61 % (ref 32–75)
NRBC # BLD: 0 K/UL (ref 0–0.01)
NRBC BLD-RTO: 0 PER 100 WBC
PLATELET # BLD AUTO: 250 K/UL (ref 150–400)
PMV BLD AUTO: 11.4 FL (ref 8.9–12.9)
POTASSIUM SERPL-SCNC: 4.2 MMOL/L (ref 3.5–5.1)
PROT SERPL-MCNC: 7.3 G/DL (ref 6.4–8.2)
RBC # BLD AUTO: 5.03 M/UL (ref 3.8–5.2)
SAMPLES BEING HELD,HOLD: NORMAL
SODIUM SERPL-SCNC: 138 MMOL/L (ref 136–145)
TIBC SERPL-MCNC: 370 UG/DL (ref 250–450)
TRIGL SERPL-MCNC: 196 MG/DL (ref ?–150)
TSH SERPL DL<=0.05 MIU/L-ACNC: 0.96 UIU/ML (ref 0.36–3.74)
VLDLC SERPL CALC-MCNC: 39.2 MG/DL
WBC # BLD AUTO: 5 K/UL (ref 3.6–11)

## 2021-11-12 PROCEDURE — 99396 PREV VISIT EST AGE 40-64: CPT | Performed by: NURSE PRACTITIONER

## 2021-11-12 RX ORDER — LEVONORGESTREL 52 MG/1
1 INTRAUTERINE DEVICE INTRAUTERINE ONCE
COMMUNITY

## 2021-11-12 NOTE — PROGRESS NOTES
Assessment/Plan:     Diagnoses and all orders for this visit:    1. Routine general medical examination at a health care facility  -     CBC WITH AUTOMATED DIFF; Future  -     METABOLIC PANEL, COMPREHENSIVE; Future  -     TSH 3RD GENERATION; Future    2. Screening, lipid  -     LIPID PANEL; Future    3. Iron deficiency anemia, unspecified iron deficiency anemia type  -     IRON PROFILE; Future  -     FERRITIN; Future    4. Encounter for hepatitis C screening test for low risk patient  -     HEPATITIS C AB; Future    5. Current moderate episode of major depressive disorder without prior episode ([facility] [address]  Stable on current therapy. Other orders  -     SAMPLES BEING HELD         Follow-up and Dispositions    · Return in about 6 months (around 5/12/2022) for Follow Up. Discussed expected course/resolution/complications of diagnosis in detail with patient. Medication risks/benefits/costs/interactions/alternatives discussed with patient. Pt was given after visit summary which includes diagnoses, current medications & vitals. Pt expressed understanding with the diagnosis and plan          Subjective:      Donnette Bence is a 42 y.o. female who presents for had concerns including Medication Refill and Labs. History of iron deficiency in the past.  History of heavy periods. She is now on the IUD. Off iron supplements for 6 months. She reports the mood as stable on Zoloft. She is followed by ob/gyn. She is well without concerns today.        Patient Active Problem List   Diagnosis Code    History of PCOS Z87.42    Insomnia G47.00    Anxiety F41.9    Menorrhagia with regular cycle N92.0    Current moderate episode of major depressive disorder without prior episode ([facility] [address] F32.1    Family history of colonic polyps Z83.71    Iron deficiency E61.1       Current Outpatient Medications   Medication Sig Dispense Refill    levonorgestreL (Mirena) 20 mcg/24 hours (7 yrs) 52 mg IUD 1 Device by IntraUTERine route once.  sertraline (ZOLOFT) 50 mg tablet Take 1 Tablet by mouth daily. 90 Tablet 0    BIOTIN PO Take  by mouth. No Known Allergies    ROS:   Review of Systems   Constitutional: Negative for fever, malaise/fatigue and weight loss. HENT: Negative for hearing loss. Eyes: Negative for blurred vision and pain. Respiratory: Negative for cough and shortness of breath. Cardiovascular: Negative for chest pain, palpitations and leg swelling. Gastrointestinal: Negative for abdominal pain, blood in stool, constipation, diarrhea and melena. Genitourinary: Negative for dysuria and hematuria. Musculoskeletal: Negative for joint pain. Skin: Negative for rash. Neurological: Negative for headaches. Psychiatric/Behavioral: Negative for depression. The patient is not nervous/anxious and does not have insomnia. Objective:     Visit Vitals  /81 (BP 1 Location: Right upper arm, BP Patient Position: Sitting, BP Cuff Size: Large adult)   Pulse 94   Temp 99.1 °F (37.3 °C) (Temporal)   Resp 18   Ht 5' 6\" (1.676 m)   Wt 159 lb 12.8 oz (72.5 kg)   LMP 11/09/2021 (Exact Date)   SpO2 99%   BMI 25.79 kg/m²       Vitals and Nurse Documentation reviewed. Physical Exam  Constitutional:       General: She is not in acute distress. HENT:      Right Ear: No drainage. No middle ear effusion. Tympanic membrane is not injected, erythematous or bulging. Left Ear: No drainage. No middle ear effusion. Tympanic membrane is not injected, erythematous or bulging. Eyes:      Pupils: Pupils are equal, round, and reactive to light. Neck:      Trachea: No tracheal deviation. Cardiovascular:      Pulses:           Dorsalis pedis pulses are 2+ on the right side and 2+ on the left side. Posterior tibial pulses are 2+ on the right side and 2+ on the left side. Heart sounds: S1 normal and S2 normal. No murmur heard. No friction rub. No gallop.     Pulmonary: Breath sounds: Normal breath sounds. No wheezing. Abdominal:      General: Bowel sounds are normal. There is no distension. Palpations: Abdomen is soft. There is no mass. Tenderness: There is no abdominal tenderness. Lymphadenopathy:      Cervical: No cervical adenopathy. Skin:     General: Skin is warm and dry. Neurological:      Cranial Nerves: No cranial nerve deficit. Sensory: Sensation is intact. Motor: Motor function is intact.

## 2021-11-12 NOTE — PROGRESS NOTES
Chief Complaint   Patient presents with    Medication Refill    Labs       1. \"Have you been to the ER, urgent care clinic since your last visit? Hospitalized since your last visit? \" No    2. \"Have you seen or consulted any other health care providers outside of the [address] [place] [address] since your last visit? \" No     3. For patients over 45: Has the patient had a colonoscopy? No     If the patient is female:    4. For patients over 40: Has the patient had a mammogram? Yes, HM satisfied with blue hyperlink    5. For patients over 21: Has the patient had a pap smear? Yes, but HM not satisfied.  Rooming MA/LPN to request most recent results, Cuong PRADHAN    3 most recent [facility] Screens 11/12/2021   Little interest or pleasure in doing things Not at all   Feeling down, depressed, irritable, or hopeless Not at all   Total Score PHQ 2 0     Health Maintenance Due   Topic Date Due    Hepatitis C Screening  Never done    Flu Vaccine (1) 09/01/2021

## 2022-02-01 NOTE — TELEPHONE ENCOUNTER
Request for refill via [email or web address] to [facility]. Thanks, Cathie Mays    Last Visit: 11/12/21 NP Yolanda  Next Appointment: Not scheduled  Previous Refill Encounter(s): 10/21/21 90    Requested Prescriptions     Pending Prescriptions Disp Refills    sertraline (ZOLOFT) 50 mg tablet 90 Tablet 1     Sig: Take 1 Tablet by mouth daily.

## 2022-02-02 RX ORDER — SERTRALINE HYDROCHLORIDE 50 MG/1
50 TABLET, FILM COATED ORAL DAILY
Qty: 90 TABLET | Refills: 1 | Status: SHIPPED | OUTPATIENT
Start: 2022-02-02 | End: 2022-08-29

## 2022-02-22 ENCOUNTER — TRANSCRIBE ORDER (OUTPATIENT)
Dept: SCHEDULING | Age: 42
End: 2022-02-22

## 2022-02-22 DIAGNOSIS — Z12.31 SCREENING MAMMOGRAM FOR HIGH-RISK PATIENT: Primary | ICD-10-CM

## 2022-02-24 ENCOUNTER — HOSPITAL ENCOUNTER (OUTPATIENT)
Dept: MAMMOGRAPHY | Age: 42
Discharge: HOME | End: 2022-02-24
Attending: FAMILY MEDICINE
Payer: COMMERCIAL

## 2022-02-24 DIAGNOSIS — Z12.31 SCREENING MAMMOGRAM FOR HIGH-RISK PATIENT: ICD-10-CM

## 2022-02-24 PROCEDURE — 77063 BREAST TOMOSYNTHESIS BI: CPT

## 2022-03-18 PROBLEM — G47.00 INSOMNIA: Status: ACTIVE | Noted: 2017-04-05

## 2022-03-18 PROBLEM — N92.0 MENORRHAGIA WITH REGULAR CYCLE: Status: ACTIVE | Noted: 2019-07-18

## 2022-03-19 PROBLEM — Z83.719 FAMILY HISTORY OF COLONIC POLYPS: Status: ACTIVE | Noted: 2019-07-18

## 2022-03-19 PROBLEM — F41.9 ANXIETY: Status: ACTIVE | Noted: 2017-04-05

## 2022-03-19 PROBLEM — Z83.71 FAMILY HISTORY OF COLONIC POLYPS: Status: ACTIVE | Noted: 2019-07-18

## 2022-03-20 PROBLEM — E61.1 IRON DEFICIENCY: Status: ACTIVE | Noted: 2021-01-09

## 2022-03-20 PROBLEM — F32.1 CURRENT MODERATE EPISODE OF MAJOR DEPRESSIVE DISORDER WITHOUT PRIOR EPISODE (HCC): Status: ACTIVE | Noted: 2019-07-18

## 2022-06-24 NOTE — PATIENT INSTRUCTIONS

## 2022-08-29 RX ORDER — SERTRALINE HYDROCHLORIDE 50 MG/1
TABLET, FILM COATED ORAL
Qty: 90 TABLET | Refills: 1 | Status: SHIPPED | OUTPATIENT
Start: 2022-08-29

## 2022-09-08 ENCOUNTER — VIRTUAL VISIT (OUTPATIENT)
Dept: FAMILY MEDICINE CLINIC | Age: 42
End: 2022-09-08
Payer: COMMERCIAL

## 2022-09-08 DIAGNOSIS — H69.81 EUSTACHIAN TUBE DYSFUNCTION, RIGHT: Primary | ICD-10-CM

## 2022-09-08 DIAGNOSIS — F32.1 CURRENT MODERATE EPISODE OF MAJOR DEPRESSIVE DISORDER WITHOUT PRIOR EPISODE (HCC): ICD-10-CM

## 2022-09-08 PROCEDURE — 99213 OFFICE O/P EST LOW 20 MIN: CPT | Performed by: NURSE PRACTITIONER

## 2022-09-08 RX ORDER — PREDNISONE 20 MG/1
20 TABLET ORAL
Qty: 5 TABLET | Refills: 0 | Status: SHIPPED | OUTPATIENT
Start: 2022-09-08

## 2022-09-08 NOTE — PROGRESS NOTES
Assessment/Plan:     Diagnoses and all orders for this visit:    1. Eustachian tube dysfunction, right  -     predniSONE (DELTASONE) 20 mg tablet; Take 1 Tablet by mouth daily (with breakfast). Recurrent and uncontrolled. She has ENT follow-up in October. Treatment as above. Follow-up if no improvement. 2. Current moderate episode of major depressive disorder without prior episode (HCC)  Continue Zoloft 50 mg once daily. Follow-up and Dispositions    Return if symptoms worsen or fail to improve. The patient has consented for synchronous (real-time) Telemedicine (audio-video technology) on 9/8/22 for their care to be delivered over telemedicine in place of their regularly scheduled office visit pursuant to the emergency declaration under the [address] [place] [address], [address] [place] [address] and the [employer] [facility] and [employer] Act, this Virtual  Visit was conducted by the practitioner who is located in the medical office in [place], [place], with patient's consent, to reduce the patient's risk of exposure to COVID-19 and provide continuity of care. Visit Length: 20-29 minutes    Discussed expected course/resolution/complications of diagnosis in detail with patient. Medication risks/benefits/costs/interactions/alternatives discussed with patient. Pt was given after visit summary which includes diagnoses, current medications & vitals. Pt expressed understanding with the diagnosis and plan          Subjective:      Belinda Vickers is a 42 y.o. female who presents for had concerns including Ear Pain. Reports a 10 day history of sinus congestion, drainage, right ear pain. Symptoms are stable over time. Reports a history of seasonal allergies. Currently attempting nasal steroids and OTC antihistamine without improvement. Reports a history of similar symptoms which have resolved with oral steroids.   Has appointment with ENT for follow-up in October. Does report her mood is stable on Zoloft at this time. Patient Active Problem List   Diagnosis Code    History of PCOS Z87.42    Insomnia G47.00    Anxiety F41.9    Menorrhagia with regular cycle N92.0    Current moderate episode of major depressive disorder without prior episode (HCC) F32.1    Family history of colonic polyps Z83.71    Iron deficiency E61.1       Current Outpatient Medications   Medication Sig Dispense Refill    predniSONE (DELTASONE) 20 mg tablet Take 1 Tablet by mouth daily (with breakfast). 5 Tablet 0    sertraline (ZOLOFT) 50 mg tablet TAKE 1 TABLET BY MOUTH ONCE DAILY 90 Tablet 1    levonorgestreL (Mirena) 20 mcg/24 hours (7 yrs) 52 mg IUD 1 Device by IntraUTERine route once. BIOTIN PO Take  by mouth. No Known Allergies    ROS:   Review of Systems   Constitutional:  Negative for chills, fever and malaise/fatigue. HENT:  Positive for congestion. Negative for ear pain, sinus pain and sore throat. Respiratory:  Negative for cough, sputum production, shortness of breath and wheezing. Cardiovascular:  Negative for chest pain. Neurological:  Negative for seizures. Endo/Heme/Allergies:  Negative for environmental allergies. Objective: There were no vitals taken for this visit. Vitals and Nurse Documentation reviewed. Physical Exam  Constitutional:       Appearance: Normal appearance. Neurological:      Mental Status: She is alert.    Psychiatric:         Attention and Perception: Attention normal.         Mood and Affect: Mood normal.         Speech: Speech normal.         Behavior: Behavior normal.         Cognition and Memory: Cognition normal.

## 2022-12-14 NOTE — DISCHARGE INSTRUCTIONS

## 2023-01-27 ENCOUNTER — TRANSCRIBE ORDER (OUTPATIENT)
Dept: SCHEDULING | Age: 43
End: 2023-01-27

## 2023-01-27 DIAGNOSIS — Z12.31 BREAST CANCER SCREENING BY MAMMOGRAM: Primary | ICD-10-CM

## 2023-03-14 ENCOUNTER — HOSPITAL ENCOUNTER (OUTPATIENT)
Dept: MAMMOGRAPHY | Age: 43
Discharge: HOME | End: 2023-03-14
Attending: NURSE PRACTITIONER
Payer: COMMERCIAL

## 2023-03-14 DIAGNOSIS — Z12.31 BREAST CANCER SCREENING BY MAMMOGRAM: ICD-10-CM

## 2023-03-14 PROCEDURE — 77063 BREAST TOMOSYNTHESIS BI: CPT

## 2023-04-17 RX ORDER — SERTRALINE HYDROCHLORIDE 50 MG/1
TABLET, FILM COATED ORAL
Qty: 90 TABLET | Refills: 1 | Status: SHIPPED | OUTPATIENT
Start: 2023-04-17

## 2023-05-23 ENCOUNTER — OFFICE VISIT (OUTPATIENT)
Age: 43
End: 2023-05-23
Payer: COMMERCIAL

## 2023-05-23 VITALS
BODY MASS INDEX: 26.71 KG/M2 | HEART RATE: 68 BPM | RESPIRATION RATE: 16 BRPM | OXYGEN SATURATION: 99 % | WEIGHT: 166.2 LBS | TEMPERATURE: 99 F | SYSTOLIC BLOOD PRESSURE: 111 MMHG | HEIGHT: 66 IN | DIASTOLIC BLOOD PRESSURE: 76 MMHG

## 2023-05-23 DIAGNOSIS — Z13.1 SCREENING FOR DIABETES MELLITUS: ICD-10-CM

## 2023-05-23 DIAGNOSIS — Z13.220 SCREENING, LIPID: ICD-10-CM

## 2023-05-23 DIAGNOSIS — F41.9 ANXIETY: ICD-10-CM

## 2023-05-23 DIAGNOSIS — Z00.00 ROUTINE GENERAL MEDICAL EXAMINATION AT A HEALTH CARE FACILITY: Primary | ICD-10-CM

## 2023-05-23 PROBLEM — F32.1 CURRENT MODERATE EPISODE OF MAJOR DEPRESSIVE DISORDER WITHOUT PRIOR EPISODE (HCC): Status: RESOLVED | Noted: 2019-07-18 | Resolved: 2023-05-23

## 2023-05-23 PROCEDURE — 99396 PREV VISIT EST AGE 40-64: CPT | Performed by: NURSE PRACTITIONER

## 2023-05-23 RX ORDER — SERTRALINE HYDROCHLORIDE 25 MG/1
25 TABLET, FILM COATED ORAL DAILY
Qty: 30 TABLET | Refills: 3 | Status: SHIPPED | OUTPATIENT
Start: 2023-05-23

## 2023-05-23 SDOH — ECONOMIC STABILITY: HOUSING INSECURITY
IN THE LAST 12 MONTHS, WAS THERE A TIME WHEN YOU DID NOT HAVE A STEADY PLACE TO SLEEP OR SLEPT IN A SHELTER (INCLUDING NOW)?: NO

## 2023-05-23 SDOH — ECONOMIC STABILITY: INCOME INSECURITY: HOW HARD IS IT FOR YOU TO PAY FOR THE VERY BASICS LIKE FOOD, HOUSING, MEDICAL CARE, AND HEATING?: NOT HARD AT ALL

## 2023-05-23 SDOH — ECONOMIC STABILITY: FOOD INSECURITY: WITHIN THE PAST 12 MONTHS, THE FOOD YOU BOUGHT JUST DIDN'T LAST AND YOU DIDN'T HAVE MONEY TO GET MORE.: NEVER TRUE

## 2023-05-23 SDOH — ECONOMIC STABILITY: FOOD INSECURITY: WITHIN THE PAST 12 MONTHS, YOU WORRIED THAT YOUR FOOD WOULD RUN OUT BEFORE YOU GOT MONEY TO BUY MORE.: NEVER TRUE

## 2023-05-23 ASSESSMENT — PATIENT HEALTH QUESTIONNAIRE - PHQ9
3. TROUBLE FALLING OR STAYING ASLEEP: 0
SUM OF ALL RESPONSES TO PHQ QUESTIONS 1-9: 0
6. FEELING BAD ABOUT YOURSELF - OR THAT YOU ARE A FAILURE OR HAVE LET YOURSELF OR YOUR FAMILY DOWN: 0
8. MOVING OR SPEAKING SO SLOWLY THAT OTHER PEOPLE COULD HAVE NOTICED. OR THE OPPOSITE, BEING SO FIGETY OR RESTLESS THAT YOU HAVE BEEN MOVING AROUND A LOT MORE THAN USUAL: 0
9. THOUGHTS THAT YOU WOULD BE BETTER OFF DEAD, OR OF HURTING YOURSELF: 0
SUM OF ALL RESPONSES TO PHQ9 QUESTIONS 1 & 2: 0
4. FEELING TIRED OR HAVING LITTLE ENERGY: 0
SUM OF ALL RESPONSES TO PHQ QUESTIONS 1-9: 0
10. IF YOU CHECKED OFF ANY PROBLEMS, HOW DIFFICULT HAVE THESE PROBLEMS MADE IT FOR YOU TO DO YOUR WORK, TAKE CARE OF THINGS AT HOME, OR GET ALONG WITH OTHER PEOPLE: 0
1. LITTLE INTEREST OR PLEASURE IN DOING THINGS: 0
5. POOR APPETITE OR OVEREATING: 0
SUM OF ALL RESPONSES TO PHQ QUESTIONS 1-9: 0
SUM OF ALL RESPONSES TO PHQ QUESTIONS 1-9: 0
2. FEELING DOWN, DEPRESSED OR HOPELESS: 0
7. TROUBLE CONCENTRATING ON THINGS, SUCH AS READING THE NEWSPAPER OR WATCHING TELEVISION: 0

## 2023-05-23 ASSESSMENT — ENCOUNTER SYMPTOMS
CONSTIPATION: 0
COUGH: 0
EYE PAIN: 0
ABDOMINAL PAIN: 0
SHORTNESS OF BREATH: 0
BLOOD IN STOOL: 0
DIARRHEA: 0

## 2023-05-23 NOTE — PROGRESS NOTES
Chief Complaint   Patient presents with    Annual Exam       1. Have you been to the ER, urgent care clinic since your last visit? Hospitalized since your last visit?       no    2. Have you seen or consulted any other health care providers outside of the [address] [place] [address] since your last visit? Include any pap smears or colon screening. no    3. For patients over 45: Has the patient had a colonoscopy? Yes     If the patient is female:    4. For patients over 40: Has the patient had a mammogram? Yes    5. For patients over 21: Has the patient had a pap smear? Yes    No flowsheet data found. No flowsheet data found. Health Maintenance Due   Topic Date Due    COVID-19 Vaccine (3 - Booster for Pfizer series) 05/27/2021    Depression Monitoring  11/12/2022       PHQ-9  5/23/2023   Little interest or pleasure in doing things 0   Little interest or pleasure in doing things -   Feeling down, depressed, or hopeless 0   Trouble falling or staying asleep, or sleeping too much 0   Feeling tired or having little energy 0   Poor appetite or overeating 0   Feeling bad about yourself - or that you are a failure or have let yourself or your family down 0   Trouble concentrating on things, such as reading the newspaper or watching television 0   Moving or speaking so slowly that other people could have noticed.  Or the opposite - being so fidgety or restless that you have been moving around a lot more than usual 0   Thoughts that you would be better off dead, or of hurting yourself in some way 0   PHQ-2 Score 0   Total Score PHQ 2 -   PHQ-9 Total Score 0   If you checked off any problems, how difficult have these problems made it for you to do your work, take care of things at home, or get along with other people? 0
mouth daily (with breakfast)       No current facility-administered medications for this visit. No Known Allergies   Patient Active Problem List   Diagnosis    Menorrhagia with regular cycle    Insomnia    Anxiety    Family history of colonic polyps    History of PCOS    Current moderate episode of major depressive disorder without prior episode ([facility] [address]    Iron deficiency     Past Medical History:   Diagnosis Date    Current moderate episode of major depressive disorder without prior episode ([facility] [address] 7/18/2019    Essential hypertension      1 st pregnancy few high blood pressure, 24 hour urine normal - few days before delivery    H/O maternal fourth degree perineal laceration, currently pregnant 10/7/2012    History of PCOS     Infertility, female     clomid pregnancy    IUD (intrauterine device) in place 03/2021    Polycystic disease, ovaries     previously on metformin    Postpartum depression     saw a counselor      Past Surgical History:   Procedure Laterality Date    GYN      fourth degree laceration      No LMP recorded. Family History   Problem Relation Age of Onset    Colon Polyps Brother     Colon Polyps Mother     Cancer Mother         endometrial    Cancer Maternal Grandfather 58        Melanoma    Colon Cancer Maternal Grandmother [de-identified]    Melanoma Father     Diabetes Father         Insluin dependendent (early onset)    Hypertension Father     Heart Disease Father       Social History     Socioeconomic History    Marital status:      Spouse name: Not on file    Number of children: Not on file    Years of education: Not on file    Highest education level: Not on file   Occupational History    Not on file   Tobacco Use    Smoking status: Never    Smokeless tobacco: Never   Substance and Sexual Activity    Alcohol use:  Yes     Alcohol/week: 1.0 standard drink     Types: 1 Glasses of wine per week    Drug use: No    Sexual activity: Yes     Partners: Male     Birth control/protection: Condom, None

## 2023-05-24 LAB
ALBUMIN SERPL-MCNC: 4.2 G/DL (ref 3.5–5)
ALBUMIN/GLOB SERPL: 1.3 (ref 1.1–2.2)
ALP SERPL-CCNC: 73 U/L (ref 45–117)
ALT SERPL-CCNC: 58 U/L (ref 12–78)
ANION GAP SERPL CALC-SCNC: 5 MMOL/L (ref 5–15)
AST SERPL-CCNC: 36 U/L (ref 15–37)
BASOPHILS # BLD: 0.1 K/UL (ref 0–0.1)
BASOPHILS NFR BLD: 1 % (ref 0–1)
BILIRUB SERPL-MCNC: 0.5 MG/DL (ref 0.2–1)
BUN SERPL-MCNC: 14 MG/DL (ref 6–20)
BUN/CREAT SERPL: 18 (ref 12–20)
CALCIUM SERPL-MCNC: 9.4 MG/DL (ref 8.5–10.1)
CHLORIDE SERPL-SCNC: 107 MMOL/L (ref 97–108)
CHOLEST SERPL-MCNC: 216 MG/DL
CO2 SERPL-SCNC: 25 MMOL/L (ref 21–32)
CREAT SERPL-MCNC: 0.78 MG/DL (ref 0.55–1.02)
DIFFERENTIAL METHOD BLD: NORMAL
EOSINOPHIL # BLD: 0 K/UL (ref 0–0.4)
EOSINOPHIL NFR BLD: 1 % (ref 0–7)
ERYTHROCYTE [DISTWIDTH] IN BLOOD BY AUTOMATED COUNT: 13.5 % (ref 11.5–14.5)
EST. AVERAGE GLUCOSE BLD GHB EST-MCNC: 105 MG/DL
GLOBULIN SER CALC-MCNC: 3.2 G/DL (ref 2–4)
GLUCOSE SERPL-MCNC: 90 MG/DL (ref 65–100)
HBA1C MFR BLD: 5.3 % (ref 4–5.6)
HCT VFR BLD AUTO: 46.9 % (ref 35–47)
HDLC SERPL-MCNC: 57 MG/DL
HDLC SERPL: 3.8 (ref 0–5)
HGB BLD-MCNC: 14.5 G/DL (ref 11.5–16)
IMM GRANULOCYTES # BLD AUTO: 0 K/UL (ref 0–0.04)
IMM GRANULOCYTES NFR BLD AUTO: 0 % (ref 0–0.5)
LDLC SERPL CALC-MCNC: 137.4 MG/DL (ref 0–100)
LYMPHOCYTES # BLD: 1.8 K/UL (ref 0.8–3.5)
LYMPHOCYTES NFR BLD: 29 % (ref 12–49)
MCH RBC QN AUTO: 29.1 PG (ref 26–34)
MCHC RBC AUTO-ENTMCNC: 30.9 G/DL (ref 30–36.5)
MCV RBC AUTO: 94 FL (ref 80–99)
MONOCYTES # BLD: 0.4 K/UL (ref 0–1)
MONOCYTES NFR BLD: 6 % (ref 5–13)
NEUTS SEG # BLD: 3.9 K/UL (ref 1.8–8)
NEUTS SEG NFR BLD: 63 % (ref 32–75)
NRBC # BLD: 0 K/UL (ref 0–0.01)
NRBC BLD-RTO: 0 PER 100 WBC
PLATELET # BLD AUTO: 269 K/UL (ref 150–400)
PMV BLD AUTO: 11.7 FL (ref 8.9–12.9)
POTASSIUM SERPL-SCNC: 4.3 MMOL/L (ref 3.5–5.1)
PROT SERPL-MCNC: 7.4 G/DL (ref 6.4–8.2)
RBC # BLD AUTO: 4.99 M/UL (ref 3.8–5.2)
SODIUM SERPL-SCNC: 137 MMOL/L (ref 136–145)
TRIGL SERPL-MCNC: 108 MG/DL
TSH SERPL DL<=0.05 MIU/L-ACNC: 0.86 UIU/ML (ref 0.36–3.74)
VLDLC SERPL CALC-MCNC: 21.6 MG/DL
WBC # BLD AUTO: 6.1 K/UL (ref 3.6–11)

## 2023-08-30 ENCOUNTER — OFFICE VISIT (OUTPATIENT)
Age: 43
End: 2023-08-30
Payer: COMMERCIAL

## 2023-08-30 VITALS
DIASTOLIC BLOOD PRESSURE: 78 MMHG | RESPIRATION RATE: 16 BRPM | TEMPERATURE: 99.2 F | BODY MASS INDEX: 26.1 KG/M2 | OXYGEN SATURATION: 98 % | HEIGHT: 66 IN | SYSTOLIC BLOOD PRESSURE: 120 MMHG | HEART RATE: 98 BPM | WEIGHT: 162.4 LBS

## 2023-08-30 DIAGNOSIS — F32.1 MAJOR DEPRESSIVE DISORDER, SINGLE EPISODE, MODERATE (HCC): Primary | ICD-10-CM

## 2023-08-30 DIAGNOSIS — Z23 ENCOUNTER FOR IMMUNIZATION: ICD-10-CM

## 2023-08-30 PROCEDURE — 90674 CCIIV4 VAC NO PRSV 0.5 ML IM: CPT | Performed by: NURSE PRACTITIONER

## 2023-08-30 PROCEDURE — 90471 IMMUNIZATION ADMIN: CPT | Performed by: NURSE PRACTITIONER

## 2023-08-30 PROCEDURE — 99213 OFFICE O/P EST LOW 20 MIN: CPT | Performed by: NURSE PRACTITIONER

## 2023-08-30 RX ORDER — FLUTICASONE PROPIONATE AND SALMETEROL 100; 50 UG/1; UG/1
POWDER RESPIRATORY (INHALATION)
COMMUNITY
Start: 2023-08-26

## 2023-08-30 RX ORDER — ALBUTEROL SULFATE 90 UG/1
AEROSOL, METERED RESPIRATORY (INHALATION)
COMMUNITY
Start: 2023-08-26

## 2023-08-30 ASSESSMENT — ENCOUNTER SYMPTOMS: SHORTNESS OF BREATH: 0

## 2023-08-30 NOTE — PROGRESS NOTES
Chief Complaint   Patient presents with    Forms     To be completed        1. \"Have you been to the ER, urgent care clinic since your last visit? Hospitalized since your last visit? \"    yes - online presciber 8/2023      2. \"Have you seen or consulted any other health care providers outside of the [address] [place] [address] since your last visit? \"     no      3. For patients over 45: Has the patient had a colonoscopy? Yes     If the patient is female:    4. For patients over 40: Has the patient had a mammogram? Yes    5. For patients over 21: Has the patient had a pap smear? Yes    No flowsheet data found. Health Maintenance Due   Topic Date Due    COVID-19 Vaccine (3 - Booster for Pfizer series) 05/27/2021    Flu vaccine (1) 08/01/2023         PHQ-9  5/23/2023   Little interest or pleasure in doing things 0   Little interest or pleasure in doing things -   Feeling down, depressed, or hopeless 0   Trouble falling or staying asleep, or sleeping too much 0   Feeling tired or having little energy 0   Poor appetite or overeating 0   Feeling bad about yourself - or that you are a failure or have let yourself or your family down 0   Trouble concentrating on things, such as reading the newspaper or watching television 0   Moving or speaking so slowly that other people could have noticed.  Or the opposite - being so fidgety or restless that you have been moving around a lot more than usual 0   Thoughts that you would be better off dead, or of hurting yourself in some way 0   PHQ-2 Score 0   Total Score PHQ 2 -   PHQ-9 Total Score 0   If you checked off any problems, how difficult have these problems made it for you to do your work, take care of things at home, or get along with other people? 0

## 2023-08-30 NOTE — PROGRESS NOTES
Assessment/Plan:     1. Major depressive disorder, single episode, moderate (HCC)  Stable on current therapy. 2. Encounter for immunization  -     KS IM ADM PRQ ID SUBQ/IM NJXS 1 VACCINE  -     Influenza, FLUCELVAX, (age 10 mo+), IM, PF, 0.5 mL         Forms completed today. 20-29 minutes spent in visit face to face today with greater than 50% of this time spent in counseling and coordination of care. Return if symptoms worsen or fail to improve. Discussed expected course/resolution/complications of diagnosis in detail with patient. Medication risks/benefits/costs/interactions/alternatives discussed with patient. Pt was given after visit summary which includes diagnoses, current medications & vitals. Pt expressed understanding with the diagnosis and plan          Subjective:      Jalil Ferreira is a 36 y.o. female who presents for had concerns including Forms (To be completed ). She has work forms for completion. She had bronchitis recently which is improving. She reports her mood as stable. Patient Active Problem List   Diagnosis    Menorrhagia with regular cycle    Insomnia    Anxiety    Family history of colonic polyps    History of PCOS    Iron deficiency       Current Outpatient Medications   Medication Sig Dispense Refill    albuterol sulfate HFA (PROVENTIL;VENTOLIN;PROAIR) 108 (90 Base) MCG/ACT inhaler INHALE 1-2 PUFFS BY MOUTH EVERY 4 TO 6 HOURS AS NEEDED FOR COUGH OR SHORTNESS OF BREATH      WIXELA INHUB 100-50 MCG/ACT AEPB diskus inhaler INHALE 1 PUFF BY MOUTH TWICE DAILY UNTIL DIRECTED TO STOP. RINSE MOUTH AFTER      sertraline (ZOLOFT) 25 MG tablet Take 1 tablet by mouth daily 30 tablet 3    BIOTIN PO Take by mouth      levonorgestrel (MIRENA, 52 MG,) IUD 52 mg 1 Device by IntraUTERine route once       No current facility-administered medications for this visit.        No Known Allergies    ROS:   Review of Systems   Constitutional:  Negative for

## 2023-11-30 NOTE — TELEPHONE ENCOUNTER
PCP: Bethany Payne, APRN - NP    Last appt: 8/30/2023   No future appointments.     Requested Prescriptions     Pending Prescriptions Disp Refills    sertraline (ZOLOFT) 50 MG tablet [Pharmacy Med Name: SERTRALINE HCL 50MG TABS] 90 tablet 1     Sig: TAKE ONE TABLET BY MOUTH ONCE A DAY         Prior labs and Blood pressures:  BP Readings from Last 3 Encounters:   08/30/23 120/78   05/23/23 111/76   11/12/21 115/81     Lab Results   Component Value Date/Time     05/23/2023 10:32 AM    K 4.3 05/23/2023 10:32 AM     05/23/2023 10:32 AM    CO2 25 05/23/2023 10:32 AM    BUN 14 05/23/2023 10:32 AM    GFRAA >60 11/12/2021 10:08 AM     No results found for: \"HBA1C\", \"[id number]\"  Lab Results   Component Value Date/Time    CHOL 216 05/23/2023 10:32 AM    HDL 57 05/23/2023 10:32 AM     No results found for: \"VITD3\", \"VD3RIA\"    Lab Results   Component Value Date/Time    TSH 0.96 11/12/2021 10:08 AM

## 2024-03-05 ENCOUNTER — TELEPHONE (OUTPATIENT)
Age: 44
End: 2024-03-05

## 2024-03-05 NOTE — PROGRESS NOTES
Postpartum evaluation    Manfred Suarez is a 44 y.o. female who presents for a postpartum exam.     She is now six weeks post normal spontaneous vaginal delivery. Her baby is doing well. She has had no menses since delivery. She has had the following significant problems since her delivery: none    The patient is breast feeding without difficulty. The patient would like to discuss birth control options. She is currently taking: no medications. She is due for her next AE in four months.      Visit Vitals    /78 (BP 1 Location: Left arm, BP Patient Position: Sitting)    Wt 166 lb 4 oz (75.4 kg)    Breastfeeding Yes    BMI 26.83 kg/m2       PHYSICAL EXAMINATION    Constitutional  · Appearance: well-nourished, well developed, alert, in no acute distress    HENT  · Head and Face: appears normal    Gastrointestinal  · Abdominal Examination: abdomen non-tender to palpation, normal bowel sounds, no masses present  · Liver and spleen: no hepatomegaly present, spleen not palpable  · Hernias: no hernias identified    Genitourinary  · External Genitalia: normal appearance for age, no discharge present, no tenderness present, no inflammatory lesions present, no masses present, no atrophy present  · Vagina: normal vaginal vault without central or paravaginal defects, no discharge present, no inflammatory lesions present, no masses present  · Bladder: non-tender to palpation  · Urethra: appears normal  · Cervix: normal   · Uterus: normal size, shape and consistency  · Adnexa: no adnexal tenderness present, no adnexal masses present  · Perineum: perineum within normal limits, no evidence of trauma, no rashes or skin lesions present  · Anus: anus within normal limits, no hemorrhoids present  · Inguinal Lymph Nodes: no lymphadenopathy present    Skin  · General Inspection: no rash, no lesions identified    Neurologic/Psychiatric  · Mental Status:  · Orientation: grossly oriented to person, place and time  · Mood and Affect: mood normal, affect appropriate    Assessment:  Normal postpartum check    Plan:  RTO for AE.   Comfortable with rhythm method hematuria/flank pain L/dysuria

## 2024-03-05 NOTE — TELEPHONE ENCOUNTER
Left a voice mail for the patient informing her that nothing was received from [facility] per Hussein.

## 2024-03-05 NOTE — TELEPHONE ENCOUNTER
----- Message from Julie Behan sent at 3/5/2024  9:58 AM EST -----  Subject: Message to Provider    QUESTIONS  Information for Provider? patient has appt with Bri for 3/29 and is   making sure her eye doctor Pawan Guerrero at [facility] sent over request   for some labs and follow up visit. She needs confirmation that you got the   info.   ---------------------------------------------------------------------------  --------------  CALL BACK INFO  [phone]; OK to leave message on voicemail  ---------------------------------------------------------------------------  --------------  SCRIPT ANSWERS  Relationship to Patient? Self

## 2024-03-13 ENCOUNTER — TRANSCRIBE ORDERS (OUTPATIENT)
Facility: HOSPITAL | Age: 44
End: 2024-03-13

## 2024-03-13 DIAGNOSIS — Z12.31 SCREENING MAMMOGRAM FOR BREAST CANCER: Primary | ICD-10-CM

## 2024-03-22 ENCOUNTER — HOSPITAL ENCOUNTER (OUTPATIENT)
Facility: HOSPITAL | Age: 44
Discharge: HOME | End: 2024-03-22
Payer: COMMERCIAL

## 2024-03-22 DIAGNOSIS — Z12.31 SCREENING MAMMOGRAM FOR BREAST CANCER: ICD-10-CM

## 2024-03-22 PROCEDURE — 77063 BREAST TOMOSYNTHESIS BI: CPT

## 2024-03-29 ENCOUNTER — OFFICE VISIT (OUTPATIENT)
Age: 44
End: 2024-03-29
Payer: COMMERCIAL

## 2024-03-29 VITALS
BODY MASS INDEX: 26.78 KG/M2 | HEIGHT: 66 IN | WEIGHT: 166.6 LBS | SYSTOLIC BLOOD PRESSURE: 123 MMHG | RESPIRATION RATE: 16 BRPM | TEMPERATURE: 99.2 F | OXYGEN SATURATION: 98 % | HEART RATE: 66 BPM | DIASTOLIC BLOOD PRESSURE: 83 MMHG

## 2024-03-29 DIAGNOSIS — F32.1 MAJOR DEPRESSIVE DISORDER, SINGLE EPISODE, MODERATE (HCC): ICD-10-CM

## 2024-03-29 DIAGNOSIS — H35.62: ICD-10-CM

## 2024-03-29 DIAGNOSIS — H35.9 RETINAL DISORDER, LEFT: ICD-10-CM

## 2024-03-29 DIAGNOSIS — F41.9 ANXIETY: Primary | ICD-10-CM

## 2024-03-29 LAB
CRP SERPL-MCNC: <0.29 MG/DL (ref 0–0.3)
ERYTHROCYTE [SEDIMENTATION RATE] IN BLOOD: 2 MM/HR (ref 0–20)

## 2024-03-29 PROCEDURE — 99214 OFFICE O/P EST MOD 30 MIN: CPT | Performed by: NURSE PRACTITIONER

## 2024-03-29 RX ORDER — SERTRALINE HYDROCHLORIDE 100 MG/1
100 TABLET, FILM COATED ORAL DAILY
Qty: 30 TABLET | Refills: 3 | Status: SHIPPED | OUTPATIENT
Start: 2024-03-29 | End: 2024-04-10 | Stop reason: SDUPTHER

## 2024-03-29 ASSESSMENT — PATIENT HEALTH QUESTIONNAIRE - PHQ9
2. FEELING DOWN, DEPRESSED OR HOPELESS: SEVERAL DAYS
SUM OF ALL RESPONSES TO PHQ QUESTIONS 1-9: 8
4. FEELING TIRED OR HAVING LITTLE ENERGY: NEARLY EVERY DAY
8. MOVING OR SPEAKING SO SLOWLY THAT OTHER PEOPLE COULD HAVE NOTICED. OR THE OPPOSITE, BEING SO FIGETY OR RESTLESS THAT YOU HAVE BEEN MOVING AROUND A LOT MORE THAN USUAL: NOT AT ALL
SUM OF ALL RESPONSES TO PHQ9 QUESTIONS 1 & 2: 1
6. FEELING BAD ABOUT YOURSELF - OR THAT YOU ARE A FAILURE OR HAVE LET YOURSELF OR YOUR FAMILY DOWN: NOT AT ALL
7. TROUBLE CONCENTRATING ON THINGS, SUCH AS READING THE NEWSPAPER OR WATCHING TELEVISION: SEVERAL DAYS
3. TROUBLE FALLING OR STAYING ASLEEP: NEARLY EVERY DAY
SUM OF ALL RESPONSES TO PHQ QUESTIONS 1-9: 8
SUM OF ALL RESPONSES TO PHQ QUESTIONS 1-9: 8
5. POOR APPETITE OR OVEREATING: NOT AT ALL
10. IF YOU CHECKED OFF ANY PROBLEMS, HOW DIFFICULT HAVE THESE PROBLEMS MADE IT FOR YOU TO DO YOUR WORK, TAKE CARE OF THINGS AT HOME, OR GET ALONG WITH OTHER PEOPLE: SOMEWHAT DIFFICULT
1. LITTLE INTEREST OR PLEASURE IN DOING THINGS: NOT AT ALL
SUM OF ALL RESPONSES TO PHQ QUESTIONS 1-9: 8
9. THOUGHTS THAT YOU WOULD BE BETTER OFF DEAD, OR OF HURTING YOURSELF: NOT AT ALL

## 2024-03-29 NOTE — PROGRESS NOTES
Chief Complaint   Patient presents with    Other     Discuss information from the eye doctor     \"Have you been to the ER, urgent care clinic since your last visit?  Hospitalized since your last visit?\"    NO    “Have you seen or consulted any other health care providers outside of [facility] since your last visit?”    YES - When: approximately 1 months ago.  Where and Why: [facility].

## 2024-03-29 NOTE — PATIENT INSTRUCTIONS
[facility]    Audrey Manrique    [phone]    [email or web address]    [email or web address]

## 2024-04-01 ENCOUNTER — OFFICE VISIT (OUTPATIENT)
Age: 44
End: 2024-04-01
Payer: COMMERCIAL

## 2024-04-01 DIAGNOSIS — F32.0 CURRENT MILD EPISODE OF MAJOR DEPRESSIVE DISORDER WITHOUT PRIOR EPISODE (HCC): Primary | ICD-10-CM

## 2024-04-01 LAB — ANA SER QL: NEGATIVE

## 2024-04-01 PROCEDURE — 90834 PSYTX W PT 45 MINUTES: CPT | Performed by: SOCIAL WORKER

## 2024-04-01 NOTE — PROGRESS NOTES
In Office-Initial Evaluation    Time Start:2:07 pm  Time End:3:01 pm    DX:    Diagnosis Orders   1. Current moderate episode of major depressive disorder without prior episode (HCC)                 Met with Ms. Feng 2024 for our first appointment.  This patient was referred by her NP, CHARLES Savage after an office visit recently.  The patient is asking for an increase in her Sertraline from 50 mg. To 100 mg because of some recent changes in her life.  The patient reports her father  .  Her father had numerous medical issues including diabetes and pneumonia.  His pneumonia got worse and he was hospitalized several times.  He was able to go home for his birthday but immediately returned to the hospital.  He had to start dialysis and never got any better. He  while in the hospital.  Ms. Feng is the only child that lived in [place] so she had to make a lot of decisions re: his care, his death, etc.  Her brother, 40 years old, did come from [place] for a brief period, right before his death and for his .  The patient was born and raised here in [place] by her biological mother and father.  Her mother is 74 years old and the daughter describes her as a horder.  Additionally she has been dx with terminal cancer.  The mother and father  after 20 years of marriage and the father remarried.  His wife was in \"denial\" during his illness and his death.  The patient stated she had to perform most of the arrangements for her father's .    The patient has been  for the past 19 years and they have three boys, ages: 6, 9 and 11.  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  She is now working as an asst in a local .  She started back to work after all of her kids went to school which was this year.  She had worked as the  for 10 years and then took off for 10 years.  Her  also works in a hospital.  Ms. Feng is

## 2024-04-10 DIAGNOSIS — F41.9 ANXIETY: ICD-10-CM

## 2024-04-10 NOTE — TELEPHONE ENCOUNTER
PCP: Bri Savage, APRN - NP    Last appt: 3/29/2024       Future Appointments   Date Time Provider Department Center   4/19/2024 10:00 AM Sharon Linton LCSW [facility] AM[facility]   5/2/2024 10:00 AM Sharon Linton LCSW [facility] AM[facility]       Requested Prescriptions     Pending Prescriptions Disp Refills    sertraline (ZOLOFT) 100 MG tablet 30 tablet 3     Sig: Take 1 tablet by mouth daily       Prior labs and Blood pressures:  BP Readings from Last 3 Encounters:   03/29/24 123/83   08/30/23 120/78   05/23/23 111/76     Lab Results   Component Value Date/Time     05/23/2023 10:32 AM    K 4.3 05/23/2023 10:32 AM     05/23/2023 10:32 AM    CO2 25 05/23/2023 10:32 AM    BUN 14 05/23/2023 10:32 AM    GFRAA >60 11/12/2021 10:08 AM     No results found for: \"HBA1C\", \"AJC4BLIO\"  Lab Results   Component Value Date/Time    CHOL 216 05/23/2023 10:32 AM    HDL 57 05/23/2023 10:32 AM     No results found for: \"VITD3\", \"VD3RIA\"    Lab Results   Component Value Date/Time    TSH 0.96 11/12/2021 10:08 AM

## 2024-04-11 RX ORDER — SERTRALINE HYDROCHLORIDE 100 MG/1
100 TABLET, FILM COATED ORAL DAILY
Qty: 30 TABLET | Refills: 3 | Status: SHIPPED | OUTPATIENT
Start: 2024-04-11

## 2024-04-19 ENCOUNTER — TELEMEDICINE (OUTPATIENT)
Age: 44
End: 2024-04-19
Payer: COMMERCIAL

## 2024-04-19 DIAGNOSIS — F32.0 CURRENT MILD EPISODE OF MAJOR DEPRESSIVE DISORDER WITHOUT PRIOR EPISODE (HCC): Primary | ICD-10-CM

## 2024-04-19 PROCEDURE — 90837 PSYTX W PT 60 MINUTES: CPT | Performed by: SOCIAL WORKER

## 2024-04-19 NOTE — PROGRESS NOTES
Virtual Visit (At Home) -Follow Up    Time Start:9:00 am  Time End:10:12 am    DX:    Diagnosis Orders   1. Current moderate episode of major depressive disorder without prior episode (HCC)             Met with Ms. Feng today for our follow up appt. This patient reports that the death of her father has brought up lots of feelings from her childhood.  She describes how her father had an affair while  and then ultimately  that person.  She knew her parents marriage was dysfunctional and she felt like the \"middle man\" trying to keep the peace.  Her mother grew up in a chaotic environment and always has low self esteem and depression.  She was not a nurturing mother so the patient states she was always looking for positive affirmations.  She remembers when she wrote a paper or did a science fair project, she would present an idea to her dad but he always had a better way or better idea.  Ms. Feng thinks her father had quite a few narcissistic traits.  To this day, the patient is always second guessing her parenting, decisions, etc.   Her biological mother is very dependant on her daughter and this patient feels pulled in many directions.  She has a hard time saying no to either the stepmother or her bio mother which often will cause friction with her family.  We will explore more of this next session.  No acute symptoms reported.    We did set a follow-up appointment with this clinician.      Follow-up appt date (if applicable) is:  may 2 @ 1:00 pm VV and May 17 @ 9:00 VV    Rosa Elena Feng, was evaluated through a synchronous (real-time) audio-video encounter. The patient (or guardian if applicable) is aware that this is a billable service, which includes applicable co-pays. This Virtual Visit was conducted with patient's (and/or legal guardian's) consent. Patient identification was verified, and a caregiver was present when appropriate.   The patient was located at

## 2024-04-30 ENCOUNTER — TELEMEDICINE (OUTPATIENT)
Age: 44
End: 2024-04-30
Payer: COMMERCIAL

## 2024-04-30 DIAGNOSIS — F32.0 CURRENT MILD EPISODE OF MAJOR DEPRESSIVE DISORDER WITHOUT PRIOR EPISODE (HCC): Primary | ICD-10-CM

## 2024-04-30 PROCEDURE — 90834 PSYTX W PT 45 MINUTES: CPT | Performed by: SOCIAL WORKER

## 2024-04-30 NOTE — PROGRESS NOTES
Virtual Visit (At Home) -Follow Up    Time Start:1:00 pm  Time End:1:51 pm    DX:    Diagnosis Orders   1. Current moderate episode of major depressive disorder without prior episode (HCC)             Met with Ms. Feng today for our follow up appt. This patient reports she continues to feel tired, angry, irritable and frustrated.  She has had an increase in her Sertraline however she became so tired on the increase, that she has decreased it back to 50 mg. She is not sure this medication is doing a lot for her.  As the session progressed, she talked a lot about her anger with her father and how he abandoned her and her mother.  She felt like he lived his life as he wanted and didn't treat his wife with respect.  The patient admitted today that she always was in the middle in that he was the \"fun one\" and her mother was \"mentally ill and a victim.\"  She continues feeling anger even after his death as he didn't put anything in his will about any of the family furniture or any of his personal stuff that she would obtain.  Rather, the stepmother has been very materialistic and states that all the things in the house are hers and no one can have anything until she dies.  Ms. Feng has a lot of repressed anger from her past and since her father , those feelings have resurfaced.  This therapist gave her the assignment of writing a letter to her  father, saying everything she wants to get off her chest and things that she would like him to know.  No acute symptoms today.    We did set a follow-up appointment with this clinician.      Follow-up appt date (if applicable) is:  within two weeks VV    Rosa Elena Feng, was evaluated through a synchronous (real-time) audio-video encounter. The patient (or guardian if applicable) is aware that this is a billable service, which includes applicable co-pays. This Virtual Visit was conducted with patient's (and/or legal guardian's)

## 2024-07-09 ENCOUNTER — PATIENT MESSAGE (OUTPATIENT)
Age: 44
End: 2024-07-09

## 2024-07-09 DIAGNOSIS — H35.069 RETINAL VASCULITIS, UNSPECIFIED LATERALITY: ICD-10-CM

## 2024-07-09 DIAGNOSIS — H35.62: ICD-10-CM

## 2024-07-09 DIAGNOSIS — H35.63 RETINAL HEMORRHAGE OF BOTH EYES: Primary | ICD-10-CM

## 2024-07-09 DIAGNOSIS — H35.9 RETINAL DISORDER, LEFT: ICD-10-CM

## 2024-07-09 DIAGNOSIS — H47.399: ICD-10-CM

## 2024-07-10 NOTE — TELEPHONE ENCOUNTER
From: Rosa Elena Feng  To: Bri Savage  Sent: 7/9/2024 11:33 AM EDT  Subject: Further Workup request     Hi Bri,    I hope you had a nice vacation. I sent a message last week and wanted to follow up. I saw my retina specialist for follow up, and he thinks I should have a further workup to understand the cause of my eye symptoms. I was unable to get an appointment with you before September, and would like to see if we can do some of the bloodwork before then. He’s also recommending a CT scan? He says my condition hasn’t worsened, but it hasn’t improved and he is not satisfied that “we have turned over every stone” to understand the cause. When I met with you, I felt good about the workup you recommended and relieved nothing came up. But now I’m left without explanation of multiple eye symptoms (retinal hemorrhaging, significant “white spots” and “tortuous veins”), and I’m hoping you can help me or offer some guidance before September.     Thank you,   Rosa Elena

## 2024-09-09 ENCOUNTER — HOSPITAL ENCOUNTER (OUTPATIENT)
Facility: HOSPITAL | Age: 44
Setting detail: SPECIMEN
Discharge: HOME | End: 2024-09-12
Payer: COMMERCIAL

## 2024-09-09 ENCOUNTER — OFFICE VISIT (OUTPATIENT)
Age: 44
End: 2024-09-09

## 2024-09-09 VITALS
HEIGHT: 66 IN | HEART RATE: 80 BPM | WEIGHT: 171.8 LBS | SYSTOLIC BLOOD PRESSURE: 120 MMHG | RESPIRATION RATE: 16 BRPM | TEMPERATURE: 98.6 F | BODY MASS INDEX: 27.61 KG/M2 | OXYGEN SATURATION: 98 % | DIASTOLIC BLOOD PRESSURE: 73 MMHG

## 2024-09-09 DIAGNOSIS — H47.399: ICD-10-CM

## 2024-09-09 DIAGNOSIS — Z12.4 ENCOUNTER FOR PAPANICOLAOU SMEAR OF CERVIX: ICD-10-CM

## 2024-09-09 DIAGNOSIS — Z13.220 SCREENING, LIPID: ICD-10-CM

## 2024-09-09 DIAGNOSIS — Z00.00 ROUTINE GENERAL MEDICAL EXAMINATION AT A HEALTH CARE FACILITY: Primary | ICD-10-CM

## 2024-09-09 DIAGNOSIS — Z78.9 HEPATITIS B VACCINATION STATUS UNKNOWN: ICD-10-CM

## 2024-09-09 DIAGNOSIS — Z23 NEEDS FLU SHOT: ICD-10-CM

## 2024-09-09 PROCEDURE — 88175 CYTOPATH C/V AUTO FLUID REDO: CPT

## 2024-09-09 PROCEDURE — 87624 HPV HI-RISK TYP POOLED RSLT: CPT

## 2024-09-09 RX ORDER — HYDROXYZINE HYDROCHLORIDE 10 MG/1
TABLET, FILM COATED ORAL
COMMUNITY
Start: 2024-08-13

## 2024-09-09 RX ORDER — FLUOXETINE 10 MG/1
CAPSULE ORAL
COMMUNITY
Start: 2024-08-26

## 2024-09-09 RX ORDER — TRAZODONE HYDROCHLORIDE 50 MG/1
TABLET, FILM COATED ORAL
COMMUNITY
Start: 2024-08-26

## 2024-09-09 SDOH — ECONOMIC STABILITY: FOOD INSECURITY: WITHIN THE PAST 12 MONTHS, YOU WORRIED THAT YOUR FOOD WOULD RUN OUT BEFORE YOU GOT MONEY TO BUY MORE.: NEVER TRUE

## 2024-09-09 SDOH — ECONOMIC STABILITY: INCOME INSECURITY: HOW HARD IS IT FOR YOU TO PAY FOR THE VERY BASICS LIKE FOOD, HOUSING, MEDICAL CARE, AND HEATING?: NOT HARD AT ALL

## 2024-09-09 SDOH — ECONOMIC STABILITY: FOOD INSECURITY: WITHIN THE PAST 12 MONTHS, THE FOOD YOU BOUGHT JUST DIDN'T LAST AND YOU DIDN'T HAVE MONEY TO GET MORE.: NEVER TRUE

## 2024-09-09 ASSESSMENT — ENCOUNTER SYMPTOMS
CONSTIPATION: 0
SHORTNESS OF BREATH: 0
ABDOMINAL PAIN: 0
DIARRHEA: 0
BLOOD IN STOOL: 0
EYE PAIN: 0
COUGH: 0

## 2024-09-10 LAB
ACE SERPL-CCNC: 47 U/L (ref 14–82)
ALBUMIN SERPL-MCNC: 4.3 G/DL (ref 3.5–5)
ALBUMIN/GLOB SERPL: 1.7 (ref 1.1–2.2)
ALP SERPL-CCNC: 77 U/L (ref 45–117)
ALT SERPL-CCNC: 51 U/L (ref 12–78)
ANION GAP SERPL CALC-SCNC: 7 MMOL/L (ref 2–12)
AST SERPL-CCNC: 25 U/L (ref 15–37)
BASOPHILS # BLD: 0 K/UL (ref 0–0.1)
BASOPHILS NFR BLD: 1 % (ref 0–1)
BILIRUB SERPL-MCNC: 0.6 MG/DL (ref 0.2–1)
BUN SERPL-MCNC: 14 MG/DL (ref 6–20)
BUN/CREAT SERPL: 16 (ref 12–20)
CALCIUM SERPL-MCNC: 9 MG/DL (ref 8.5–10.1)
CHLORIDE SERPL-SCNC: 106 MMOL/L (ref 97–108)
CHOLEST SERPL-MCNC: 190 MG/DL
CO2 SERPL-SCNC: 27 MMOL/L (ref 21–32)
CREAT SERPL-MCNC: 0.88 MG/DL (ref 0.55–1.02)
DIFFERENTIAL METHOD BLD: NORMAL
EOSINOPHIL # BLD: 0.1 K/UL (ref 0–0.4)
EOSINOPHIL NFR BLD: 1 % (ref 0–7)
ERYTHROCYTE [DISTWIDTH] IN BLOOD BY AUTOMATED COUNT: 12.8 % (ref 11.5–14.5)
GLOBULIN SER CALC-MCNC: 2.6 G/DL (ref 2–4)
GLUCOSE SERPL-MCNC: 88 MG/DL (ref 65–100)
HBV SURFACE AB SER QL: REACTIVE
HBV SURFACE AB SER-ACNC: >1000 MIU/ML
HCT VFR BLD AUTO: 42.2 % (ref 35–47)
HDLC SERPL-MCNC: 49 MG/DL
HDLC SERPL: 3.9 (ref 0–5)
HGB BLD-MCNC: 14 G/DL (ref 11.5–16)
IMM GRANULOCYTES # BLD AUTO: 0 K/UL (ref 0–0.04)
IMM GRANULOCYTES NFR BLD AUTO: 0 % (ref 0–0.5)
LDLC SERPL CALC-MCNC: 117 MG/DL (ref 0–100)
LYMPHOCYTES # BLD: 1.7 K/UL (ref 0.8–3.5)
LYMPHOCYTES NFR BLD: 32 % (ref 12–49)
MCH RBC QN AUTO: 29.6 PG (ref 26–34)
MCHC RBC AUTO-ENTMCNC: 33.2 G/DL (ref 30–36.5)
MCV RBC AUTO: 89.2 FL (ref 80–99)
MONOCYTES # BLD: 0.3 K/UL (ref 0–1)
MONOCYTES NFR BLD: 6 % (ref 5–13)
NEUTS SEG # BLD: 3.2 K/UL (ref 1.8–8)
NEUTS SEG NFR BLD: 60 % (ref 32–75)
NRBC # BLD: 0 K/UL (ref 0–0.01)
NRBC BLD-RTO: 0 PER 100 WBC
PLATELET # BLD AUTO: 214 K/UL (ref 150–400)
PMV BLD AUTO: 11.2 FL (ref 8.9–12.9)
POTASSIUM SERPL-SCNC: 4.1 MMOL/L (ref 3.5–5.1)
PROT SERPL-MCNC: 6.9 G/DL (ref 6.4–8.2)
RBC # BLD AUTO: 4.73 M/UL (ref 3.8–5.2)
RPR SER QL: NONREACTIVE
SODIUM SERPL-SCNC: 140 MMOL/L (ref 136–145)
TRIGL SERPL-MCNC: 120 MG/DL
VLDLC SERPL CALC-MCNC: 24 MG/DL
WBC # BLD AUTO: 5.3 K/UL (ref 3.6–11)

## 2024-09-12 LAB — HPV I/H RISK 1 DNA CVX QL PROBE+SIG AMP: NEGATIVE

## 2024-09-13 LAB
GAMMA INTERFERON BACKGROUND BLD IA-ACNC: 0.1 IU/ML
M TB IFN-G BLD-IMP: NEGATIVE
M TB IFN-G CD4+ BCKGRND COR BLD-ACNC: 0.1 IU/ML
M TB IFN-G CD4+CD8+ BCKGRND COR BLD-ACNC: 0.13 IU/ML
MITOGEN IGNF BCKGRD COR BLD-ACNC: >10 IU/ML
SERVICE CMNT-IMP: NORMAL

## 2024-11-24 ENCOUNTER — OFFICE VISIT (OUTPATIENT)
Age: 44
End: 2024-11-24

## 2024-11-24 VITALS
OXYGEN SATURATION: 96 % | HEART RATE: 96 BPM | TEMPERATURE: 98.1 F | WEIGHT: 167.2 LBS | DIASTOLIC BLOOD PRESSURE: 85 MMHG | BODY MASS INDEX: 26.24 KG/M2 | RESPIRATION RATE: 18 BRPM | HEIGHT: 67 IN | SYSTOLIC BLOOD PRESSURE: 133 MMHG

## 2024-11-24 DIAGNOSIS — J02.9 SORE THROAT: Primary | ICD-10-CM

## 2024-11-24 PROBLEM — L03.012 PARONYCHIA OF LEFT THUMB: Status: ACTIVE | Noted: 2024-11-24

## 2024-11-24 PROBLEM — Z71.9 ENCOUNTER FOR HEALTH EDUCATION: Status: ACTIVE | Noted: 2024-11-24

## 2024-11-24 PROBLEM — B07.8 COMMON WART: Status: ACTIVE | Noted: 2024-11-24

## 2024-11-24 LAB — S PYO AG THROAT QL: NORMAL

## 2024-11-24 RX ORDER — LIDOCAINE HYDROCHLORIDE 20 MG/ML
15 SOLUTION OROPHARYNGEAL PRN
Qty: 100 ML | Refills: 0 | Status: SHIPPED | OUTPATIENT
Start: 2024-11-24

## 2025-03-27 ENCOUNTER — PATIENT MESSAGE (OUTPATIENT)
Age: 45
End: 2025-03-27

## 2025-03-27 ENCOUNTER — TRANSCRIBE ORDERS (OUTPATIENT)
Facility: HOSPITAL | Age: 45
End: 2025-03-27

## 2025-03-27 DIAGNOSIS — Z12.31 VISIT FOR SCREENING MAMMOGRAM: Primary | ICD-10-CM

## 2025-03-28 RX ORDER — TRAZODONE HYDROCHLORIDE 50 MG/1
50 TABLET ORAL NIGHTLY
Qty: 90 TABLET | Refills: 3 | Status: SHIPPED | OUTPATIENT
Start: 2025-03-28

## 2025-04-14 ENCOUNTER — HOSPITAL ENCOUNTER (OUTPATIENT)
Facility: HOSPITAL | Age: 45
Discharge: HOME | End: 2025-04-17
Payer: COMMERCIAL

## 2025-04-14 DIAGNOSIS — Z12.31 VISIT FOR SCREENING MAMMOGRAM: ICD-10-CM

## 2025-04-14 PROCEDURE — 77063 BREAST TOMOSYNTHESIS BI: CPT
